# Patient Record
Sex: FEMALE | Race: WHITE | NOT HISPANIC OR LATINO | Employment: OTHER | URBAN - METROPOLITAN AREA
[De-identification: names, ages, dates, MRNs, and addresses within clinical notes are randomized per-mention and may not be internally consistent; named-entity substitution may affect disease eponyms.]

---

## 2019-08-13 ENCOUNTER — TELEPHONE (OUTPATIENT)
Dept: GASTROENTEROLOGY | Facility: CLINIC | Age: 59
End: 2019-08-13

## 2019-08-13 DIAGNOSIS — K82.4 CHOLESTEROLOSIS GALLBLADDER: Primary | ICD-10-CM

## 2019-08-13 NOTE — TELEPHONE ENCOUNTER
1-yr recall [address] [place] [address]  Provider:  Dr David Callejas:  [facility]

## 2019-10-08 NOTE — TELEPHONE ENCOUNTER
Pt left  mssg stating someone called this morning; assumes it is about US results?; asks for [facility] [phone]

## 2019-10-08 NOTE — TELEPHONE ENCOUNTER
Patient has not been seen since 2017 and no visits scheduled  We have just been ordering annual ultrasound to monitor polyp  Are we to continue to place recall or is she to come in for visit  Please advise

## 2019-10-09 NOTE — TELEPHONE ENCOUNTER
Pt left VM mssg stating someone left a mssg yesterday; is hoping it is about US results    [phone]

## 2019-10-10 NOTE — TELEPHONE ENCOUNTER
Pt states she thinks someone is trying to reach her about US  Rev'd US stable and noted to f/u w/ OV  Lengthy call w/ Pt  I rev'd ECW info (dates seen, f/u recommendations from Consuel[facility] [address] 43)  Pt questions need for f/u and wants CB to rev DI in more details [phone]

## 2019-10-10 NOTE — TELEPHONE ENCOUNTER
I spoke with patient and reviewed that ultrasound stable but Dr Juan Antonio Flanagan did agree with follow up since patient last seen 2017 for visit  Patient agreeable

## 2020-01-14 ENCOUNTER — OFFICE VISIT (OUTPATIENT)
Dept: GASTROENTEROLOGY | Facility: CLINIC | Age: 60
End: 2020-01-14
Payer: COMMERCIAL

## 2020-01-14 VITALS
SYSTOLIC BLOOD PRESSURE: 150 MMHG | DIASTOLIC BLOOD PRESSURE: 92 MMHG | HEART RATE: 64 BPM | WEIGHT: 251 LBS | HEIGHT: 66 IN | BODY MASS INDEX: 40.34 KG/M2

## 2020-01-14 DIAGNOSIS — Z83.71 FAMILY HISTORY OF COLONIC POLYPS: ICD-10-CM

## 2020-01-14 DIAGNOSIS — K76.0 FATTY LIVER: Primary | ICD-10-CM

## 2020-01-14 DIAGNOSIS — K82.4 GALLBLADDER POLYP: ICD-10-CM

## 2020-01-14 DIAGNOSIS — C50.911 MALIGNANT NEOPLASM OF RIGHT FEMALE BREAST, UNSPECIFIED ESTROGEN RECEPTOR STATUS, UNSPECIFIED SITE OF BREAST (HCC): ICD-10-CM

## 2020-01-14 PROBLEM — Z83.719 FAMILY HISTORY OF COLONIC POLYPS: Status: ACTIVE | Noted: 2020-01-14

## 2020-01-14 PROCEDURE — 99214 OFFICE O/P EST MOD 30 MIN: CPT | Performed by: INTERNAL MEDICINE

## 2020-01-14 RX ORDER — OMEPRAZOLE 20 MG/1
0.5 TABLET, DELAYED RELEASE ORAL DAILY
COMMUNITY

## 2020-01-14 RX ORDER — VITAMIN E 268 MG
400 CAPSULE ORAL DAILY
Qty: 30 CAPSULE | Refills: 11 | Status: SHIPPED | OUTPATIENT
Start: 2020-01-14

## 2020-01-14 RX ORDER — FLUTICASONE PROPIONATE 50 MCG
SPRAY, SUSPENSION (ML) NASAL DAILY
COMMUNITY

## 2020-01-14 NOTE — PROGRESS NOTES
[facility] - Outpatient Follow-up Note  Layo Kat 64 y o  female MRN: [id number]  Encounter: [id number]    ASSESSMENT AND PLAN:      1  Fatty liver  42-year-old female history of breast cancer status post chemo, completed in May 2018, here today for follow-up of her fatty liver  Previous workup in July 2017 negative for viral, autoimmune, metabolic diseases  Recent ultrasound in 2019 still showing fatty liver  - will obtain a baseline fibrosis score  Also discussed with the patient importance of weight loss  Recommend intermittent fasting  Also has not had follow-up with her primary in over 2 years  Recommend getting her cholesterol blood work followed up  - Liver Fibrosis, Fibrotest Actitest Panel; Future  - TSH, 3rd generation with Free T4 reflex; Future  - Lipid panel; Future  - Hemoglobin A1c (w/out EAG); Future    - vitamin E, tocopherol, 400 units capsule; Take 1 capsule (400 Units total) by mouth daily  Dispense: 30 capsule; Refill: 11    2  Gallbladder polyp  History of gallbladder polyps  The past few ultrasounds have showed stable sizes of these 2 polyps  No further workup is indicated at this point  3  Family history of colonic polyps  Sister with colon polyps  The last colonoscopy performed in August 2017 was negative  She was at the time given a 10 year recall  However, given the family history, we will change the recall to 5 years  Next due August 2022  4  Malignant neoplasm of right female breast, unspecified estrogen receptor status, unspecified site of breast [facility])  Status post chemotherapy, completed in May 2018  Currently without any evidence of disease  Followup Appointment:  6 months  ______________________________________________________________________    Chief Complaint   Patient presents with    Yearly Follow up    Ultrasound results     HPI:  42-year-old female here today for follow-up of her fatty liver    Really with no complaints  Initially gained about 80 lb while she was undergoing chemotherapy for her breast cancer  She has not been able to get this weight off  Currently without any complaints however  Denies any nausea vomiting, jaundice, abdominal pains  Did have her ultrasound for her gallbladder polyp follow-up which showed no change in size  Bowel movements are regular, no bleeding  Historical Information   Past Medical History:   Diagnosis Date    Abnormal LFTs     Breast cancer, right ([address] )     s/p lumpectomy & chemo    Fatty liver      Past Surgical History:   Procedure Laterality Date    BREAST LUMPECTOMY Right     and SLND    COLONOSCOPY  08/21/2017    ENDOMETRIAL ABLATION W/ NOVASURE      TONSILLECTOMY      WISDOM TOOTH EXTRACTION       Social History     Substance and Sexual Activity   Alcohol Use Not Currently    Comment: Denies use     Social History     Substance and Sexual Activity   Drug Use Never    Comment: Denies use     Social History     Tobacco Use   Smoking Status Never Smoker   Smokeless Tobacco Never Used     Family History   Problem Relation Age of Onset    Colon polyps Sister 21    Heart disease Father     Colorectal Cancer Neg Hx     Colon cancer Neg Hx          Current Outpatient Medications:     fluticasone (FLONASE) 50 mcg/act nasal spray    omeprazole (PRILOSEC OTC) 20 MG tablet    vitamin E, tocopherol, 400 units capsule  Allergies   Allergen Reactions    Anesthesia S-I-60 Vomiting     Other reaction(s): Unknown    Crab (Diagnostic)      Other reaction(s): Unknown     Reviewed medications and allergies and updated as indicated    PHYSICAL EXAM:    Blood pressure 150/92, pulse 64, height 5' 6" (1 676 m), weight 114 kg (251 lb)  Body mass index is 40 51 kg/m²  General Appearance: NAD, cooperative, alert  Eyes: Anicteric, PERRLA, EOMI  ENT:  Normocephalic, atraumatic, normal mucosa      Neck:  Supple, symmetrical, trachea midline  Resp:  Clear to auscultation bilaterally; no rales, rhonchi or wheezing; respirations unlabored   CV:  S1 S2, Regular rate and rhythm; no murmur, rub, or gallop  GI:  Soft, non-tender, non-distended; normal bowel sounds; no masses, no organomegaly   Rectal: Deferred  Musculoskeletal: No cyanosis, clubbing or edema  Normal ROM  Skin:  No jaundice, rashes, or lesions   Heme/Lymph: No palpable cervical lymphadenopathy  Psych: Normal affect, good eye contact  Neuro: No gross deficits, AAOx3    Lab Results:   No results found for: WBC, HGB, HCT, MCV, PLT  No results found for: NA, K, CL, CO2, ANIONGAP, BUN, CREATININE, GLUCOSE, GLUF, CALCIUM, CORRECTEDCA, AST, ALT, ALKPHOS, PROT, BILITOT, EGFR  No results found for: IRON, TIBC, FERRITIN  No results found for: LIPASE    Radiology Results:   No results found

## 2020-01-14 NOTE — LETTER
January 14, 2020     Nba Hamilton DO  P O  [facility]    Patient: Josefina Smith   YOB: 1960   Date of Visit: 1/14/2020       Dear Dr Kayla Stokes: Thank you for referring Josefina Smith to me for evaluation  Below are my notes for this consultation  If you have questions, please do not hesitate to call me  I look forward to following your patient along with you  Sincerely,        Rama Paredes MD        CC: No Recipients  Rama Paredes MD  1/14/2020 11:28 AM  Sign at close encounter  [facility] - Outpatient Follow-up Note  Josefina Smith 64 y o  female MRN: [id number]  Encounter: [id number]    ASSESSMENT AND PLAN:      1  Fatty liver  16-year-old female history of breast cancer status post chemo, completed in May 2018, here today for follow-up of her fatty liver  Previous workup in July 2017 negative for viral, autoimmune, metabolic diseases  Recent ultrasound in 2019 still showing fatty liver  - will obtain a baseline fibrosis score  Also discussed with the patient importance of weight loss  Recommend intermittent fasting  Also has not had follow-up with her primary in over 2 years  Recommend getting her cholesterol blood work followed up  - Liver Fibrosis, Fibrotest Actitest Panel; Future  - TSH, 3rd generation with Free T4 reflex; Future  - Lipid panel; Future  - Hemoglobin A1c (w/out EAG); Future    - vitamin E, tocopherol, 400 units capsule; Take 1 capsule (400 Units total) by mouth daily  Dispense: 30 capsule; Refill: 11    2  Gallbladder polyp  History of gallbladder polyps  The past few ultrasounds have showed stable sizes of these 2 polyps  No further workup is indicated at this point  3  Family history of colonic polyps  Sister with colon polyps  The last colonoscopy performed in August 2017 was negative  She was at the time given a 10 year recall    However, given the family history, we will change the recall to 5 years  Next due August 2022  4  Malignant neoplasm of right female breast, unspecified estrogen receptor status, unspecified site of breast [facility])  Status post chemotherapy, completed in May 2018  Currently without any evidence of disease  Followup Appointment:  6 months  ______________________________________________________________________    Chief Complaint   Patient presents with    Yearly Follow up    Ultrasound results     HPI:  56-year-old female here today for follow-up of her fatty liver  Really with no complaints  Initially gained about 80 lb while she was undergoing chemotherapy for her breast cancer  She has not been able to get this weight off  Currently without any complaints however  Denies any nausea vomiting, jaundice, abdominal pains  Did have her ultrasound for her gallbladder polyp follow-up which showed no change in size  Bowel movements are regular, no bleeding      Historical Information   Past Medical History:   Diagnosis Date    Abnormal LFTs     Breast cancer, right ([address] )     s/p lumpectomy & chemo    Fatty liver      Past Surgical History:   Procedure Laterality Date    BREAST LUMPECTOMY Right     and SLND    COLONOSCOPY  08/21/2017    ENDOMETRIAL ABLATION W/ NOVASURE      TONSILLECTOMY      WISDOM TOOTH EXTRACTION       Social History     Substance and Sexual Activity   Alcohol Use Not Currently    Comment: Denies use     Social History     Substance and Sexual Activity   Drug Use Never    Comment: Denies use     Social History     Tobacco Use   Smoking Status Never Smoker   Smokeless Tobacco Never Used     Family History   Problem Relation Age of Onset    Colon polyps Sister 21    Heart disease Father     Colorectal Cancer Neg Hx     Colon cancer Neg Hx          Current Outpatient Medications:     fluticasone (FLONASE) 50 mcg/act nasal spray    omeprazole (PRILOSEC OTC) 20 MG tablet    vitamin E, tocopherol, 400 units capsule  Allergies Allergen Reactions    Anesthesia S-I-60 Vomiting     Other reaction(s): Unknown    Crab (Diagnostic)      Other reaction(s): Unknown     Reviewed medications and allergies and updated as indicated    PHYSICAL EXAM:    Blood pressure 150/92, pulse 64, height 5' 6" (1 676 m), weight 114 kg (251 lb)  Body mass index is 40 51 kg/m²  General Appearance: NAD, cooperative, alert  Eyes: Anicteric, PERRLA, EOMI  ENT:  Normocephalic, atraumatic, normal mucosa  Neck:  Supple, symmetrical, trachea midline  Resp:  Clear to auscultation bilaterally; no rales, rhonchi or wheezing; respirations unlabored   CV:  S1 S2, Regular rate and rhythm; no murmur, rub, or gallop  GI:  Soft, non-tender, non-distended; normal bowel sounds; no masses, no organomegaly   Rectal: Deferred  Musculoskeletal: No cyanosis, clubbing or edema  Normal ROM  Skin:  No jaundice, rashes, or lesions   Heme/Lymph: No palpable cervical lymphadenopathy  Psych: Normal affect, good eye contact  Neuro: No gross deficits, AAOx3    Lab Results:   No results found for: WBC, HGB, HCT, MCV, PLT  No results found for: NA, K, CL, CO2, ANIONGAP, BUN, CREATININE, GLUCOSE, GLUF, CALCIUM, CORRECTEDCA, AST, ALT, ALKPHOS, PROT, BILITOT, EGFR  No results found for: IRON, TIBC, FERRITIN  No results found for: LIPASE    Radiology Results:   No results found

## 2020-01-14 NOTE — PATIENT INSTRUCTIONS
Non-Alcoholic Fatty Liver Disease   WHAT YOU NEED TO KNOW:   Non-alcoholic fatty liver disease (NAFLD) is a buildup of fat in your liver from a condition other than alcoholism  DISCHARGE INSTRUCTIONS:   Medicines:   · Medicines  may be given to manage blood sugar or cholesterol levels  · Take your medicine as directed  Contact your healthcare provider if you think your medicine is not helping or if you have side effects  Tell him or her if you are allergic to any medicine  Keep a list of the medicines, vitamins, and herbs you take  Include the amounts, and when and why you take them  Bring the list or the pill bottles to follow-up visits  Carry your medicine list with you in case of an emergency  Follow up with your healthcare provider as directed: You may need to return for more tests  You may also be referred to a specialist  Write down your questions so you remember to ask them during your visits  Manage NAFLD:   · Maintain a healthy weight  Ask your healthcare provider how much you should weigh  Ask him to help you create a weight loss plan if you are overweight  · Exercise  Aerobic exercise 3 times a week for 20 to 45 minutes can help decrease fat buildup in your liver  Examples are cycling, brisk walking, and jogging  Ask your healthcare provider about the best exercise plan for you  · Eat healthy foods  Examples are vegetables, fruit, whole-grain breads, low-fat dairy products, beans, lean meats, and fish  Foods low in simple carbohydrates, high fructose corn syrup, and trans fat may help decrease fat buildup in your liver  · Do not drink alcohol  Alcohol may make NAFLD worse and harm your liver  Contact your healthcare provider if:   · You have increased pain or swelling in your abdomen  · You feel more tired than usual     · You bruise or bleed easily  · Your skin or the whites of your eyes look yellow  · You have questions or concerns about your condition or care    Return to the emergency department if:   · You have shortness of breath  · You have trouble thinking clearly or are confused  · You feel lightheaded or faint  · You have shaking, chills, and a fever  © 2017 [address] [employer] Information is for End User's use only and may not be sold, redistributed or otherwise used for commercial purposes  All illustrations and images included in CareNotes® are the copyrighted property of A D A M [employer] Inc  or Daniel Ledesma  The above information is an  only  It is not intended as medical advice for individual conditions or treatments  Talk to your doctor, nurse or pharmacist before following any medical regimen to see if it is safe and effective for you

## 2020-01-17 LAB
A2 MACROGLOB SERPL-MCNC: 133 MG/DL (ref 106–279)
ALBUMIN SERPL-MCNC: 4.3 G/DL (ref 3.6–5.1)
ALBUMIN/GLOB SERPL: 2 (CALC) (ref 1–2.5)
ALP SERPL-CCNC: 93 U/L (ref 33–130)
ALT SERPL-CCNC: 55 U/L (ref 6–29)
ALT SERPL-CCNC: 56 U/L (ref 6–29)
APO A-I SERPL-MCNC: 135 MG/DL (ref 101–198)
AST SERPL-CCNC: 39 U/L (ref 10–35)
BASOPHILS # BLD AUTO: 72 CELLS/UL (ref 0–200)
BASOPHILS NFR BLD AUTO: 0.9 %
BILIRUB SERPL-MCNC: 0.6 MG/DL (ref 0.2–1.2)
BILIRUB SERPL-MCNC: 0.7 MG/DL (ref 0.2–1.2)
BUN SERPL-MCNC: 15 MG/DL (ref 7–25)
BUN/CREAT SERPL: ABNORMAL (CALC) (ref 6–22)
CALCIUM SERPL-MCNC: 9.6 MG/DL (ref 8.6–10.4)
CHLORIDE SERPL-SCNC: 105 MMOL/L (ref 98–110)
CHOLEST SERPL-MCNC: 135 MG/DL
CHOLEST/HDLC SERPL: 2.5 (CALC)
CO2 SERPL-SCNC: 28 MMOL/L (ref 20–32)
CREAT SERPL-MCNC: 0.83 MG/DL (ref 0.5–1.05)
EOSINOPHIL # BLD AUTO: 192 CELLS/UL (ref 15–500)
EOSINOPHIL NFR BLD AUTO: 2.4 %
ERYTHROCYTE [DISTWIDTH] IN BLOOD BY AUTOMATED COUNT: 12.3 % (ref 11–15)
FIBROSIS STAGE SERPL QL: ABNORMAL
GGT SERPL-CCNC: 40 U/L (ref 3–70)
GLOBULIN SER CALC-MCNC: 2.2 G/DL (CALC) (ref 1.9–3.7)
GLUCOSE SERPL-MCNC: 79 MG/DL (ref 65–99)
HAPTOGLOB SERPL-MCNC: 47 MG/DL (ref 43–212)
HBA1C MFR BLD: 4.9 % OF TOTAL HGB
HCT VFR BLD AUTO: 41.3 % (ref 35–45)
HDLC SERPL-MCNC: 54 MG/DL
HGB BLD-MCNC: 13.9 G/DL (ref 11.7–15.5)
LDLC SERPL CALC-MCNC: 67 MG/DL (CALC)
LIVER FIBR SCORE SERPL CALC.FIBROSURE: 0.27
LYMPHOCYTES # BLD AUTO: 2688 CELLS/UL (ref 850–3900)
LYMPHOCYTES NFR BLD AUTO: 33.6 %
MCH RBC QN AUTO: 31.4 PG (ref 27–33)
MCHC RBC AUTO-ENTMCNC: 33.7 G/DL (ref 32–36)
MCV RBC AUTO: 93.4 FL (ref 80–100)
MICRODELETION SYND BLD/T FISH: ABNORMAL
MICRODELETION SYND BLD/T FISH: ABNORMAL
MONOCYTES # BLD AUTO: 664 CELLS/UL (ref 200–950)
MONOCYTES NFR BLD AUTO: 8.3 %
NECROINFLAMMATORY ACT GRADE SERPL QL: ABNORMAL
NECROINFLAMMATORY ACT SCORE SERPL: 0.32
NEUTROPHILS # BLD AUTO: 4384 CELLS/UL (ref 1500–7800)
NEUTROPHILS NFR BLD AUTO: 54.8 %
NONHDLC SERPL-MCNC: 81 MG/DL (CALC)
PLATELET # BLD AUTO: 248 THOUSAND/UL (ref 140–400)
PMV BLD REES-ECKER: 10.3 FL (ref 7.5–12.5)
POTASSIUM SERPL-SCNC: 4.6 MMOL/L (ref 3.5–5.3)
PROT SERPL-MCNC: 6.5 G/DL (ref 6.1–8.1)
RBC # BLD AUTO: 4.42 MILLION/UL (ref 3.8–5.1)
SL AMB EGFR AFRICAN AMERICAN: 91 ML/MIN/1.73M2
SL AMB EGFR NON AFRICAN AMERICAN: 78 ML/MIN/1.73M2
SL AMB FOOTNOTE: ABNORMAL
SL AMB REFERENCE ID: ABNORMAL
SODIUM SERPL-SCNC: 141 MMOL/L (ref 135–146)
TRIGL SERPL-MCNC: 67 MG/DL
TSH SERPL-ACNC: 1.26 MIU/L (ref 0.4–4.5)
WBC # BLD AUTO: 8 THOUSAND/UL (ref 3.8–10.8)

## 2020-01-22 NOTE — RESULT ENCOUNTER NOTE
Still with slightly elevated liver tests  Otherwise labs are okay  Follow-up in the office as scheduled

## 2021-03-30 DIAGNOSIS — Z23 ENCOUNTER FOR IMMUNIZATION: ICD-10-CM

## 2022-01-15 PROBLEM — Z98.890 HISTORY OF ENDOMETRIAL ABLATION: Status: ACTIVE | Noted: 2022-01-15

## 2022-01-18 ENCOUNTER — ANNUAL EXAM (OUTPATIENT)
Dept: OBGYN CLINIC | Facility: CLINIC | Age: 62
End: 2022-01-18
Payer: COMMERCIAL

## 2022-01-18 VITALS
WEIGHT: 255.4 LBS | SYSTOLIC BLOOD PRESSURE: 118 MMHG | BODY MASS INDEX: 41.05 KG/M2 | DIASTOLIC BLOOD PRESSURE: 70 MMHG | HEIGHT: 66 IN

## 2022-01-18 DIAGNOSIS — Z01.419 ENCOUNTER FOR GYNECOLOGICAL EXAMINATION (GENERAL) (ROUTINE) WITHOUT ABNORMAL FINDINGS: Primary | ICD-10-CM

## 2022-01-18 DIAGNOSIS — Z12.11 COLON CANCER SCREENING: ICD-10-CM

## 2022-01-18 DIAGNOSIS — Z78.0 ASYMPTOMATIC MENOPAUSAL STATE: ICD-10-CM

## 2022-01-18 DIAGNOSIS — Z13.820 OSTEOPOROSIS SCREENING: ICD-10-CM

## 2022-01-18 DIAGNOSIS — Z12.4 SCREENING FOR MALIGNANT NEOPLASM OF THE CERVIX: ICD-10-CM

## 2022-01-18 PROCEDURE — 99396 PREV VISIT EST AGE 40-64: CPT | Performed by: OBSTETRICS & GYNECOLOGY

## 2022-01-18 RX ORDER — FLUTICASONE PROPIONATE 50 MCG
SPRAY, SUSPENSION (ML) NASAL DAILY
COMMUNITY

## 2022-01-18 RX ORDER — TRIAMCINOLONE ACETONIDE 1 MG/G
CREAM TOPICAL
COMMUNITY
Start: 2021-12-21

## 2022-01-18 NOTE — PROGRESS NOTES
Assessment/Plan:    Encounter for gynecological examination (general) (routine) without abnormal findings  All well, no complaints here for annual gyn exam  History of right breast cancer 2017, continues to follow with surgery, oncology  Normal mammo 3/2021 per pt report  Normal breast and limited pelvic exams  Pap done  Dexa order given  Due for colonoscopy, referral given  Diagnoses and all orders for this visit:    Encounter for gynecological examination (general) (routine) without abnormal findings    Screening for malignant neoplasm of the cervix  -     Thinprep Tis Pap and HPV mRNA E6/E7 Reflex HPV 16,18/45    Colon cancer screening  -     Ambulatory Referral to Gastroenterology; Future    Asymptomatic menopausal state  -     DXA bone density spine hip and pelvis; Future    Osteoporosis screening  -     DXA bone density spine hip and pelvis; Future    Other orders  -     triamcinolone (KENALOG) 0 1 % cream  -     fluticasone (FLONASE) 50 mcg/act nasal spray; in the morning          Subjective:      Patient ID: Rickey Castillo is a 63 y o  female  Here for well check  The following portions of the patient's history were reviewed and updated as appropriate:   She  has a past medical history of Abnormal LFTs, Breast cancer, right ([address] ) (2017), Fatty liver, Fracture, ankle (06/2021), GERD (gastroesophageal reflux disease), History of abnormal uterine bleeding, History of endometriosis, History of screening mammography (03/2021), Ovarian cyst, Papanicolaou smear (06/05/2018), Proximal humerus fracture (11/2020), and Uterine fibroid    She   Patient Active Problem List    Diagnosis Date Noted    Encounter for gynecological examination (general) (routine) without abnormal findings 01/18/2022    History of endometrial ablation - 2008 01/15/2022    BMI 40 0-44 9, adult ([address] ) 01/15/2022    Family history of colonic polyps 01/14/2020    Fatty liver 01/14/2020    Gallbladder polyp 01/14/2020  Breast [facility] right [address] ) 01/14/2020     She  has a past surgical history that includes Breast lumpectomy (Right, 2017); Endometrial ablation w/ novasure (2008); [place] tooth extraction; Colonoscopy (08/21/2017); Mastectomy (2017); Dilation and curettage of uterus (2008); Tonsillectomy (1984); LAPAROSCOPY ([address] [place] [address] [address]); Varicose vein surgery (2018); DXA procedure(historical) (2017); and Ankle fracture surgery (06/2021)  Her family history includes Colon polyps (age of onset: 21) in her sister; Heart disease in her father  She  reports that she has never smoked  She has never used smokeless tobacco  She reports previous alcohol use  She reports that she does not use drugs  Current Outpatient Medications   Medication Sig Dispense Refill    fluticasone (FLONASE) 50 mcg/act nasal spray daily      fluticasone (FLONASE) 50 mcg/act nasal spray in the morning      omeprazole (PRILOSEC OTC) 20 MG tablet Take 0 5 tablets by mouth daily      triamcinolone (KENALOG) 0 1 % cream       vitamin E, tocopherol, 400 units capsule Take 1 capsule (400 Units total) by mouth daily 30 capsule 11     No current facility-administered medications for this visit  She is allergic to crab (diagnostic) - food allergy and propofol       Review of Systems  No PMB, breast, bladder, bowel changes  No new persistent pain, bloating, early satiety or pelvic pressure    Objective:      /70   Ht 5' 5 5" (1 664 m)   Wt 116 kg (255 lb 6 4 oz)   Breastfeeding No   BMI 41 85 kg/m²          Physical Exam  General appearance: no distress, pleasant  Neck: thyroid without nodules or thyromegaly, no palpable adenopathy  Lymph nodes: no palpable adenopathy  Breasts: s/p right lumpectomy and XRT with changes   No masses, nodes or skin changes bilaterally  Abdomen: soft, obese, non tender, no palpable masses  Pelvic exam: normal atrophic external genitalia, urethral meatus normal, vagina atrophic without lesions, very high and poorly visualized due to habitus, no lesions palpated or visualized, bimanual limited due to habitus,  uterus small, non tender, no adnexal masses, non tender  Rectal exam: normal sphincter tone, limited due to habitus, no masses, RV confirms above

## 2022-01-18 NOTE — ASSESSMENT & PLAN NOTE
All well, no complaints here for annual gyn exam  History of right breast cancer 2017, continues to follow with surgery, oncology  Normal mammo 3/2021 per pt report  Normal breast and limited pelvic exams  Pap done  Dexa order given  Due for colonoscopy, referral given

## 2022-01-18 NOTE — PATIENT INSTRUCTIONS
Return to office in one year unless having any problems such as breast changes, bleeding, new persistent pain, new progressive bloating, new problems eating (getting full to quickly) or new constant urinary pressure that does not resolve in one week      Lubricants to consider - Replens, Astroglide or coconut oil

## 2022-01-18 NOTE — LETTER
January 18, 2022     Nonah Fitting, DO  P O  [facility]    Patient: Carlton Negron   YOB: 1960   Date of Visit: 1/18/2022       Dear Dr Clara Price: Thank you for referring Nubia Medina to me for evaluation  Below are my notes for this consultation  If you have questions, please do not hesitate to call me  I look forward to following your patient along with you  Sincerely,        Kwadwo Zeng MD        CC: No Recipients  Kwadwo Zeng MD  1/18/2022 10:20 AM  Incomplete  Assessment/Plan:    Encounter for gynecological examination (general) (routine) without abnormal findings  All well, no complaints here for annual gyn exam  History of right breast cancer 2017, continues to follow with surgery, oncology  Normal mammo 3/2021 per pt report  Normal breast and limited pelvic exams  Pap done  Dexa order given  Due for colonoscopy, referral given  Diagnoses and all orders for this visit:    Encounter for gynecological examination (general) (routine) without abnormal findings    Screening for malignant neoplasm of the cervix  -     Thinprep Tis Pap and HPV mRNA E6/E7 Reflex HPV 16,18/45    Colon cancer screening  -     Ambulatory Referral to Gastroenterology; Future    Asymptomatic menopausal state  -     DXA bone density spine hip and pelvis; Future    Osteoporosis screening  -     DXA bone density spine hip and pelvis; Future    Other orders  -     triamcinolone (KENALOG) 0 1 % cream  -     fluticasone (FLONASE) 50 mcg/act nasal spray; in the morning          Subjective:      Patient ID: Carlton Negron is a 63 y o  female  Here for well check        The following portions of the patient's history were reviewed and updated as appropriate:   She  has a past medical history of Abnormal LFTs, Breast cancer, right ([address] ) (2017), Fatty liver, Fracture, ankle (06/2021), GERD (gastroesophageal reflux disease), History of endometriosis, History of screening mammography (03/2021), Ovarian cyst, Papanicolaou smear (06/05/2018), Proximal humerus fracture (11/2020), and Uterine fibroid  She   Patient Active Problem List    Diagnosis Date Noted    Encounter for gynecological examination (general) (routine) without abnormal findings 01/18/2022    History of endometrial ablation - 2008 01/15/2022    BMI 40 0-44 9, adult ([facility] ) 01/15/2022    Family history of colonic polyps 01/14/2020    Fatty liver 01/14/2020    Gallbladder polyp 01/14/2020    Breast cancer, right ([facility] ) 01/14/2020     She  has a past surgical history that includes Breast lumpectomy (Right, 2017); Endometrial ablation w/ novasure (2008); [place] tooth extraction; Colonoscopy (08/21/2017); Mastectomy (2017); Dilation and curettage of uterus (2008); Tonsillectomy (1984); LAPAROSCOPY ([address] [place] [address] [address]); Varicose vein surgery (2018); DXA procedure(historical) (2017); and Ankle fracture surgery (06/2021)  Her family history includes Colon polyps (age of onset: 21) in her sister; Heart disease in her father  She  reports that she has never smoked  She has never used smokeless tobacco  She reports previous alcohol use  She reports that she does not use drugs  Current Outpatient Medications   Medication Sig Dispense Refill    fluticasone (FLONASE) 50 mcg/act nasal spray daily      fluticasone (FLONASE) 50 mcg/act nasal spray in the morning      omeprazole (PRILOSEC OTC) 20 MG tablet Take 0 5 tablets by mouth daily      triamcinolone (KENALOG) 0 1 % cream       vitamin E, tocopherol, 400 units capsule Take 1 capsule (400 Units total) by mouth daily 30 capsule 11     No current facility-administered medications for this visit  She is allergic to crab (diagnostic) - food allergy and propofol       Review of Systems  No PMB, breast, bladder, bowel changes   No new persistent pain, bloating, early satiety or pelvic pressure    Objective:      /70   Ht 5' 5 5" (1 664 m)   Wt 116 kg (255 lb 6 4 oz) Breastfeeding No   BMI 41 85 kg/m²          Physical Exam  General appearance: no distress, pleasant  Neck: thyroid without nodules or thyromegaly, no palpable adenopathy  Lymph nodes: no palpable adenopathy  Breasts: s/p right lumpectomy and XRT with changes  No masses, nodes or skin changes bilaterally  Abdomen: soft, obese, non tender, no palpable masses  Pelvic exam: normal atrophic external genitalia, urethral meatus normal, vagina atrophic without lesions, very high and poorly visualized due to habitus, no lesions palpated or visualized, bimanual limited due to habitus,  uterus small, non tender, no adnexal masses, non tender  Rectal exam: normal sphincter tone, limited due to habitus, no masses, RV confirms above      Danyel Thompson MD  1/18/2022  8:18 AM  Sign when Signing Visit  Assessment/Plan:    No problem-specific Assessment & Plan notes found for this encounter  There are no diagnoses linked to this encounter  Subjective:      Patient ID: Beltran Neil is a 63 y o  female  Here for well check  The following portions of the patient's history were reviewed and updated as appropriate:   She  has a past medical history of Abnormal LFTs, Abnormal vaginal bleeding, Breast cancer, right ([facility] ) (2017), Endometriosis, Fatty liver, GERD (gastroesophageal reflux disease), History of endometriosis, Ovarian cyst, Papanicolaou smear (06/05/2018), PMS (premenstrual syndrome), and Uterine fibroid  She   Patient Active Problem List    Diagnosis Date Noted    History of endometrial ablation - 2008 01/15/2022    BMI 40 0-44 9, adult ([facility] ) 01/15/2022    Family history of colonic polyps 01/14/2020    Fatty liver 01/14/2020    Gallbladder polyp 01/14/2020    Breast cancer, right ([facility] ) 01/14/2020     She  has a past surgical history that includes Breast lumpectomy (Right, 2017); Endometrial ablation w/ novasure (2008); [place] tooth extraction; Colonoscopy (08/21/2017);  Mastectomy (2017); Dilation and curettage of uterus (2008); Tonsillectomy (1984); LAPAROSCOPY ([address] [place] [address] [address]); Varicose vein surgery (2018); and DXA procedure(historical) (2017)  Her family history includes Colon polyps (age of onset: 21) in her sister; Heart disease in her father  She  reports that she has never smoked  She has never used smokeless tobacco  She reports previous alcohol use  She reports that she does not use drugs  Current Outpatient Medications   Medication Sig Dispense Refill    fluticasone (FLONASE) 50 mcg/act nasal spray daily      omeprazole (PRILOSEC OTC) 20 MG tablet Take 0 5 tablets by mouth daily      vitamin E, tocopherol, 400 units capsule Take 1 capsule (400 Units total) by mouth daily 30 capsule 11     No current facility-administered medications for this visit  She is allergic to crab (diagnostic) - food allergy and propofol       Review of Systems      Objective: There were no vitals taken for this visit           Physical Exam

## 2022-01-21 LAB
CLINICAL INFO: NORMAL
CYTO CVX: NORMAL
CYTOLOGY CMNT CVX/VAG CYTO-IMP: NORMAL
DATE PREVIOUS BX: NORMAL
HPV E6+E7 MRNA CVX QL NAA+PROBE: NOT DETECTED
LMP START DATE: NORMAL
SL AMB PREV. PAP:: NORMAL
SPECIMEN SOURCE CVX/VAG CYTO: NORMAL

## 2022-01-22 NOTE — RESULT ENCOUNTER NOTE
Called [facility] to check status of transport. Spoke with Fidel Parkinson, still unable to find transport as of now.  Trip # [id number] Stable  Follow up in the office as previously scheduled

## 2023-08-26 NOTE — PROGRESS NOTES
Assessment/Plan:    Encounter for gynecological examination (general) (routine) without abnormal findings  All well, no complaints. Frustrated with weight  Normal breast and pelvic exams. Last pap 1/18/22 neg/HPV neg; due 2027  Mammo order given, last 3/16/23  Colonoscopy 2017, cologuard with PCP 6/2023, due 2026  Dexa 3/9/22 spine osteopenia, low risk fracture. Calcium recs reviewed. BMI 43  Had gained significant weight during infertility treatments and then while managed on Megace for years with prior gyn for bleeding. Has tried weight watchers. Discussed available apps that can be helpful, notably NOOM. Breast cancer, right ([address])  Five years from diagnosis and therapy. Oncology released from care. No change in SBE/CBE. Mammo order given. Diagnoses and all orders for this visit:    Encounter for gynecological examination (general) (routine) without abnormal findings    Other orders  -     Liquid-based pap, screening  -     Restasis 0.05 % ophthalmic emulsion; 1 DROP INTO BOTH EYES TWICE DAILY          Subjective:      Patient ID: Polina Couch is a 66 y.o. female. HPI Here for well check. The following portions of the patient's history were reviewed and updated as appropriate:   She  has a past medical history of Abnormal LFTs, Breast cancer, right ([address]) (2017), Fatty liver, Fracture, ankle (06/2021), GERD (gastroesophageal reflux disease), History of abnormal uterine bleeding, History of endometriosis, History of screening mammography (03/2021), Ovarian cyst, Papanicolaou smear (06/05/2018), Proximal humerus fracture (11/2020), and Uterine fibroid.   She   Patient Active Problem List    Diagnosis Date Noted   • BMI 43 08/29/2023   • Encounter for gynecological examination (general) (routine) without abnormal findings 01/18/2022   • History of endometrial ablation - 2008 01/15/2022   • BMI 40.0-44.9, adult ([address]) 01/15/2022   • Family history of colonic polyps 01/14/2020   • Fatty liver 01/14/2020   • Gallbladder polyp 01/14/2020   • Breast cancer, right ([address]) 01/14/2020     She  has a past surgical history that includes Breast lumpectomy (Right, 2017); Endometrial ablation w/ novasure (2008); [place] tooth extraction; Colonoscopy (08/21/2017); Mastectomy (2017); Dilation and curettage of uterus (2008); Tonsillectomy (1984); LAPAROSCOPY ([place]); Varicose vein surgery (2018); DXA procedure(historical) (03/2022); Ankle fracture surgery (06/2021); and Mammo (historical) (03/16/2023). Her family history includes Heart disease in her father; No Known Problems in her maternal grandfather, mother, paternal grandfather, paternal grandmother, sister, and sister; Parkinsonism in her maternal grandmother. She  reports that she has never smoked. She has never used smokeless tobacco. She reports that she does not currently use alcohol. She reports that she does not use drugs. Current Outpatient Medications   Medication Sig Dispense Refill   • fluticasone (FLONASE) 50 mcg/act nasal spray daily     • omeprazole (PRILOSEC OTC) 20 MG tablet Take 0.5 tablets by mouth daily     • triamcinolone (KENALOG) 0.1 % cream      • vitamin E, tocopherol, 400 units capsule Take 1 capsule (400 Units total) by mouth daily 30 capsule 11   • Restasis 0.05 % ophthalmic emulsion 1 DROP INTO BOTH EYES TWICE DAILY       No current facility-administered medications for this visit. She is allergic to anesthesia s-i-40 [propofol] and crab (diagnostic) - food allergy. .    Review of Systems  No PMB, breast, bladder, bowel changes.  No new persistent pain, bloating, early satiety or pelvic pressure      Objective:      /70 (BP Location: Left arm, Patient Position: Sitting, Cuff Size: Large)   Ht 5' 4.25" (1.632 m)   Wt 115 kg (253 lb)   BMI 43.09 kg/m²          Physical Exam    General appearance: no distress, pleasant  Neck: thyroid without nodules or thyromegaly, no palpable adenopathy  Lymph nodes: no palpable adenopathy  Breasts: s/p right lumpectomy and XRT with fibrosis and skin thickening, no masses, nodes.  Left without masses, nodes or skin changes  Abdomen: soft, non tender, no palpable masses  Pelvic exam: normal atrophic external genitalia, urethral meatus normal, vagina atrophic without lesions, cervix high atrophic without lesions, bimanual limited due to habitus, uterus small, non tender, no adnexal masses, non tender  Rectal exam: normal sphincter tone, no masses, RV confirms above

## 2023-08-29 ENCOUNTER — ANNUAL EXAM (OUTPATIENT)
Dept: OBGYN CLINIC | Facility: CLINIC | Age: 63
End: 2023-08-29
Payer: COMMERCIAL

## 2023-08-29 VITALS
BODY MASS INDEX: 43.19 KG/M2 | DIASTOLIC BLOOD PRESSURE: 70 MMHG | HEIGHT: 64 IN | SYSTOLIC BLOOD PRESSURE: 118 MMHG | WEIGHT: 253 LBS

## 2023-08-29 DIAGNOSIS — Z01.419 ENCOUNTER FOR GYNECOLOGICAL EXAMINATION (GENERAL) (ROUTINE) WITHOUT ABNORMAL FINDINGS: Primary | ICD-10-CM

## 2023-08-29 DIAGNOSIS — Z12.31 ENCOUNTER FOR SCREENING MAMMOGRAM FOR MALIGNANT NEOPLASM OF BREAST: ICD-10-CM

## 2023-08-29 DIAGNOSIS — Z85.3 PERSONAL HISTORY OF BREAST CANCER: ICD-10-CM

## 2023-08-29 PROCEDURE — 99396 PREV VISIT EST AGE 40-64: CPT | Performed by: OBSTETRICS & GYNECOLOGY

## 2023-08-29 RX ORDER — CYCLOSPORINE 0.5 MG/ML
EMULSION OPHTHALMIC
COMMUNITY
Start: 2023-08-21

## 2023-08-29 NOTE — ASSESSMENT & PLAN NOTE
All well, no complaints. Frustrated with weight  Normal breast and pelvic exams. Last pap 1/18/22 neg/HPV neg; due 2027  Mammo order given, last 3/16/23  Colonoscopy 2017, cologuard with PCP 6/2023, due 2026  Dexa 3/9/22 spine osteopenia, low risk fracture. Calcium recs reviewed.

## 2023-08-29 NOTE — ASSESSMENT & PLAN NOTE
Had gained significant weight during infertility treatments and then while managed on Megace for years with prior gyn for bleeding. Has tried weight watchers. Discussed available apps that can be helpful, notably NOOM.

## 2023-08-29 NOTE — LETTER
August 29, 2023     Dr Mahad Tellez. [address] [facility]    Patient: Yen Hayden   YOB: 1960   Date of Visit: 8/29/2023       Dear Dr Amadeo Ulrich:    Thank you for referring Laurie Saunders to me for evaluation. Below are my notes for this consultation. If you have questions, please do not hesitate to call me. I look forward to following your patient along with you. Sincerely,        Eliot Pettit MD        CC: No Recipients    Eliot Pettit MD  8/29/2023  1:11 PM  Sign when Signing Visit  Assessment/Plan:    Encounter for gynecological examination (general) (routine) without abnormal findings  All well, no complaints. Frustrated with weight  Normal breast and pelvic exams. Last pap 1/18/22 neg/HPV neg; due 2027  Mammo order given, last 3/16/23  Colonoscopy 2017, cologuard with PCP 6/2023, due 2026  Dexa 3/9/22 spine osteopenia, low risk fracture. Calcium recs reviewed. BMI 43  Had gained significant weight during infertility treatments and then while managed on Megace for years with prior gyn for bleeding. Has tried weight watchers. Discussed available apps that can be helpful, notably NOOM. Breast cancer, right ([address])  Five years from diagnosis and therapy. Oncology released from care. No change in SBE/CBE. Mammo order given. Diagnoses and all orders for this visit:    Encounter for gynecological examination (general) (routine) without abnormal findings    Other orders  -     Liquid-based pap, screening  -     Restasis 0.05 % ophthalmic emulsion; 1 DROP INTO BOTH EYES TWICE DAILY         Subjective:     Patient ID: Yen Hayden is a 66 y.o. female. HPI Here for well check.     The following portions of the patient's history were reviewed and updated as appropriate:   She  has a past medical history of Abnormal LFTs, Breast cancer, right ([address]) (2017), Fatty liver, Fracture, ankle (06/2021), GERD (gastroesophageal reflux disease), History of abnormal uterine bleeding, History of endometriosis, History of screening mammography (03/2021), Ovarian cyst, Papanicolaou smear (06/05/2018), Proximal humerus fracture (11/2020), and Uterine fibroid. She   Patient Active Problem List    Diagnosis Date Noted   • BMI 43 08/29/2023   • Encounter for gynecological examination (general) (routine) without abnormal findings 01/18/2022   • History of endometrial ablation - 2008 01/15/2022   • BMI 40.0-44.9, adult ([facility]) 01/15/2022   • Family history of colonic polyps 01/14/2020   • Fatty liver 01/14/2020   • Gallbladder polyp 01/14/2020   • Breast cancer, right ([address]) 01/14/2020     She  has a past surgical history that includes Breast lumpectomy (Right, 2017); Endometrial ablation w/ novasure (2008); [place] tooth extraction; Colonoscopy (08/21/2017); Mastectomy (2017); Dilation and curettage of uterus (2008); Tonsillectomy (1984); [facility]); Varicose vein surgery (2018); DXA procedure(historical) (03/2022); Ankle fracture surgery (06/2021); and Mammo (historical) (03/16/2023). Her family history includes Heart disease in her father; No Known Problems in her maternal grandfather, mother, paternal grandfather, paternal grandmother, sister, and sister; Parkinsonism in her maternal grandmother. She  reports that she has never smoked. She has never used smokeless tobacco. She reports that she does not currently use alcohol. She reports that she does not use drugs. Current Outpatient Medications   Medication Sig Dispense Refill   • fluticasone (FLONASE) 50 mcg/act nasal spray daily     • omeprazole (PRILOSEC OTC) 20 MG tablet Take 0.5 tablets by mouth daily     • triamcinolone (KENALOG) 0.1 % cream      • vitamin E, tocopherol, 400 units capsule Take 1 capsule (400 Units total) by mouth daily 30 capsule 11   • Restasis 0.05 % ophthalmic emulsion 1 DROP INTO BOTH EYES TWICE DAILY       No current facility-administered medications for this visit.      She is allergic to anesthesia s-i-40 [propofol] and crab (diagnostic) - food allergy. .    Review of Systems No PMB, breast, bladder, bowel changes. No new persistent pain, bloating, early satiety or pelvic pressure      Objective:      /70 (BP Location: Left arm, Patient Position: Sitting, Cuff Size: Large)   Ht 5' 4.25" (1.632 m)   Wt 115 kg (253 lb)   BMI 43.09 kg/m²         Physical Exam   General appearance: no distress, pleasant  Neck: thyroid without nodules or thyromegaly, no palpable adenopathy  Lymph nodes: no palpable adenopathy  Breasts: s/p right lumpectomy and XRT with fibrosis and skin thickening, no masses, nodes.  Left without masses, nodes or skin changes  Abdomen: soft, non tender, no palpable masses  Pelvic exam: normal atrophic external genitalia, urethral meatus normal, vagina atrophic without lesions, cervix high atrophic without lesions, bimanual limited due to habitus, uterus small, non tender, no adnexal masses, non tender  Rectal exam: normal sphincter tone, no masses, RV confirms above

## 2023-08-29 NOTE — PATIENT INSTRUCTIONS
Consider exploring the  [employer] for help with nutrition/activity balance. Return to office in one year unless having any problems such as breast changes, bleeding, new persistent pain, new progressive bloating, new problems eating (getting full to quickly) or new constant urinary pressure that does not resolve in one week. Call in six months to schedule your annual visit. Continue to strive for 1200 mg of calcium and 1000 IU of vitamin D daily in diet and supplements combined for your bone health. You can only absorb 600 mg of calcium at a time. Avoid excess calcium as this may adversely effect your heart. There are 400 mg of calcium in an 8 oz serving of dairy.

## 2023-08-29 NOTE — ASSESSMENT & PLAN NOTE
Five years from diagnosis and therapy. Oncology released from care. No change in SBE/CBE. Mammo order given.

## 2024-02-21 PROBLEM — Z01.419 ENCOUNTER FOR GYNECOLOGICAL EXAMINATION (GENERAL) (ROUTINE) WITHOUT ABNORMAL FINDINGS: Status: RESOLVED | Noted: 2022-01-18 | Resolved: 2024-02-21

## 2024-08-29 NOTE — PROGRESS NOTES
Assessment/Plan:    Encounter for gynecological examination (general) (routine) without abnormal findings  Here for well check, noticed one spot in underwear 2 months ago, none since.   No other breast or gyn complaints.    Normal breast and pelvic exams.  Last pap 1/2022 neg/HPV neg;  due 2027  Mammo order given, last 3/20/24 BIRADS 2B  Colonoscopy  2017, cologuard with PCP 6/2023, due 2026  Dexa 3/2022 spine osteopenia, low risk fracture. Calcium recs reviewed.    Breast cancer, right (HCC). 2017  No change in self breast or clinical breast exams.  7 years from diagnosis, [facility].   Mammo order given.    PMB (postmenopausal bleeding)  One spot noted 2 months ago, none since.  Will check u/s and schedule f/u in 3 weeks.   Of note, h/o endometrial ablation.  Orders given.    BMI 40.0-44.9, adult ([facility])  Continued frustration.   Offered weight management consultation. Agrees, referral placed.       Diagnoses and all orders for this visit:    Encounter for gynecological examination (general) (routine) without abnormal findings    PMB (postmenopausal bleeding)  -     US pelvis complete w transvaginal; Future    BMI 40.0-44.9, adult ([facility])  -     Ambulatory Referral to Weight Management; Future    Encounter for screening mammogram for malignant neoplasm of breast  -     Mammo screening bilateral w 3d and cad; Future          Subjective:      Patient ID: Libby Evans is a 64 y.o. female.    HPI Here for well check.    The following portions of the patient's history were reviewed and updated as appropriate: She  has a past medical history of Abnormal LFTs, Breast cancer, right (HCC) (2017), Fatty liver, Fracture, ankle (06/2021), GERD (gastroesophageal reflux disease), History of abnormal uterine bleeding, History of endometriosis, History of screening mammography (03/2021), Ovarian cyst, Papanicolaou smear (06/05/2018), Proximal humerus fracture (11/2020), and Uterine fibroid.  She   Patient Active Problem List     Diagnosis Date Noted    PMB (postmenopausal bleeding) 09/06/2024    BMI 43 08/29/2023    History of endometrial ablation - 2008 01/15/2022    BMI 40.0-44.9, adult (HCC) 01/15/2022    Family history of colonic polyps 01/14/2020    Fatty liver 01/14/2020    Gallbladder polyp 01/14/2020    Breast cancer, right (HCC). 2017 01/14/2020     She  has a past surgical history that includes Breast lumpectomy (Right, 2017); Endometrial ablation w/ novasure (2008); [place] tooth extraction; Colonoscopy (08/21/2017); Mastectomy (2017); Dilation and curettage of uterus (2008); Tonsillectomy (1984); LAPAROSCOPY (1996 & 1991); Varicose vein surgery (2018); DXA procedure(historical) (03/2022); Ankle fracture surgery (06/2021); and Mammo (historical) (03/16/2023).  Her family history includes Heart disease in her father; No Known Problems in her maternal grandfather, mother, paternal grandfather, paternal grandmother, sister, and sister; Parkinsonism in her maternal grandmother.  She  reports that she has never smoked. She has never been exposed to tobacco smoke. She has never used smokeless tobacco. She reports that she does not currently use alcohol. She reports that she does not use drugs.  Current Outpatient Medications   Medication Sig Dispense Refill    fluticasone (FLONASE) 50 mcg/act nasal spray daily      omeprazole (PRILOSEC OTC) 20 MG tablet Take 0.5 tablets by mouth daily      Restasis 0.05 % ophthalmic emulsion 1 DROP INTO BOTH EYES TWICE DAILY      triamcinolone (KENALOG) 0.1 % cream       vitamin E, tocopherol, 400 units capsule Take 1 capsule (400 Units total) by mouth daily 30 capsule 11     No current facility-administered medications for this visit.     She is allergic to anesthesia s-i-40 [propofol] and crab (diagnostic) - food allergy..    Review of Systems  No breast, bladder, bowel changes. No new persistent pain, bloating, early satiety or pelvic pressure  One spot noted 2 months ago  Frustrated with weight  "    Objective:      /74 (BP Location: Left arm, Patient Position: Sitting, Cuff Size: Standard)   Ht 5' 5.5\" (1.664 m)   Wt 116 kg (256 lb)   BMI 41.95 kg/m²          Physical Exam    General appearance: no distress, pleasant  Neck: thyroid without nodules or thyromegaly, no palpable adenopathy  Lymph nodes: no palpable adenopathy  Breasts: s/p right lumpectomy and XRT with fibrosis, no masses, nodes or skin changes bilaterally  Abdomen: soft, non tender, no palpable masses  Pelvic exam: normal atrophic external genitalia, urethral meatus normal, vagina atrophic without lesions, cervix atrophic without lesions, bimanual limited due to habitus, uterus small, non tender, no adnexal masses, non tender  Rectal exam: normal sphincter tone, no masses, RV confirms above    "

## 2024-09-06 ENCOUNTER — ANNUAL EXAM (OUTPATIENT)
Dept: OBGYN CLINIC | Facility: CLINIC | Age: 64
End: 2024-09-06
Payer: COMMERCIAL

## 2024-09-06 VITALS
BODY MASS INDEX: 41.14 KG/M2 | WEIGHT: 256 LBS | HEIGHT: 66 IN | DIASTOLIC BLOOD PRESSURE: 74 MMHG | SYSTOLIC BLOOD PRESSURE: 142 MMHG

## 2024-09-06 DIAGNOSIS — Z12.31 ENCOUNTER FOR SCREENING MAMMOGRAM FOR MALIGNANT NEOPLASM OF BREAST: ICD-10-CM

## 2024-09-06 DIAGNOSIS — N95.0 PMB (POSTMENOPAUSAL BLEEDING): ICD-10-CM

## 2024-09-06 DIAGNOSIS — Z01.419 ENCOUNTER FOR GYNECOLOGICAL EXAMINATION (GENERAL) (ROUTINE) WITHOUT ABNORMAL FINDINGS: Primary | ICD-10-CM

## 2024-09-06 PROCEDURE — 99396 PREV VISIT EST AGE 40-64: CPT | Performed by: OBSTETRICS & GYNECOLOGY

## 2024-09-06 NOTE — ASSESSMENT & PLAN NOTE
No change in self breast or clinical breast exams.  7 years from diagnosis, [place].   Mammo order given.

## 2024-09-06 NOTE — ASSESSMENT & PLAN NOTE
One spot noted 2 months ago, none since.  Will check u/s and schedule f/u in 3 weeks.   Of note, h/o endometrial ablation.  Orders given.

## 2024-09-06 NOTE — PATIENT INSTRUCTIONS
Return to office in one year unless having any problems such as breast changes, bleeding, new persistent pain, new progressive bloating, new problems eating (getting full to quickly) or new constant urinary pressure that does not resolve in one week.    Continue to strive for 1200 mg of calcium and 1000 IU of vitamin D daily in diet and supplements combined for your bone health.  You can only absorb 600 mg of calcium at a time. Avoid excess calcium as this may adversely effect your heart.  There are 400 mg of calcium in an 8 oz serving of dairy.  Bard milk has 600 mg of calcium in an 8 oz serving.

## 2024-09-06 NOTE — ASSESSMENT & PLAN NOTE
Here for well check, noticed one spot in underwear 2 months ago, none since.   No other breast or gyn complaints.    Normal breast and pelvic exams.  Last pap 1/2022 neg/HPV neg;  due 2027  Mammo order given, last 3/20/24 BIRADS 2B  Colonoscopy  2017, cologuard with PCP 6/2023, due 2026  Dexa 3/2022 spine osteopenia, low risk fracture. Calcium recs reviewed.

## 2024-09-06 NOTE — LETTER
September 6, 2024     Corey Ken DO  [address]  [facility] [address]    Patient: Libby Evans   YOB: 1960   Date of Visit: 9/6/2024       Dear Dr. Ken:    Libby Evans was in today for her annual gyn exam. Below are my notes for this visit.    If you have questions, please do not hesitate to call me. I look forward to following your patient along with you.         Sincerely,        Grace Brown MD        CC: No Recipients    Grace Brown MD  9/6/2024 11:32 AM  Sign when Signing Visit  Assessment/Plan:    Encounter for gynecological examination (general) (routine) without abnormal findings  Here for well check, noticed one spot in underwear 2 months ago, none since.   No other breast or gyn complaints.    Normal breast and pelvic exams.  Last pap 1/2022 neg/HPV neg;  due 2027  Mammo order given, last 3/20/24 BIRADS 2B  Colonoscopy  2017, cologuard with PCP 6/2023, due 2026  Dexa 3/2022 spine osteopenia, low risk fracture. Calcium recs reviewed.    Breast cancer, right (HCC). 2017  No change in self breast or clinical breast exams.  7 years from diagnosis, [facility].   Mammo order given.    PMB (postmenopausal bleeding)  One spot noted 2 months ago, none since.  Will check u/s and schedule f/u in 3 weeks.   Of note, h/o endometrial ablation.  Orders given.    BMI 40.0-44.9, adult (HCC)  Continued frustration.   Offered weight management consultation. Agrees, referral placed.       Diagnoses and all orders for this visit:    Encounter for gynecological examination (general) (routine) without abnormal findings    PMB (postmenopausal bleeding)  -     US pelvis complete w transvaginal; Future    BMI 40.0-44.9, adult (HCC)  -     Ambulatory Referral to Weight Management; Future    Encounter for screening mammogram for malignant neoplasm of breast  -     Mammo screening bilateral w 3d and cad; Future          Subjective:      Patient ID: Libby Evans is a 64 y.o. female.    HPI Here for  well check.    The following portions of the patient's history were reviewed and updated as appropriate: She  has a past medical history of Abnormal LFTs, Breast cancer, right (HCC) (2017), Fatty liver, Fracture, ankle (06/2021), GERD (gastroesophageal reflux disease), History of abnormal uterine bleeding, History of endometriosis, History of screening mammography (03/2021), Ovarian cyst, Papanicolaou smear (06/05/2018), Proximal humerus fracture (11/2020), and Uterine fibroid.  She   Patient Active Problem List    Diagnosis Date Noted   • PMB (postmenopausal bleeding) 09/06/2024   • BMI 43 08/29/2023   • History of endometrial ablation - 2008 01/15/2022   • BMI 40.0-44.9, adult (HCC) 01/15/2022   • Family history of colonic polyps 01/14/2020   • Fatty liver 01/14/2020   • Gallbladder polyp 01/14/2020   • Breast cancer, right (HCC). 2017 01/14/2020     She  has a past surgical history that includes Breast lumpectomy (Right, 2017); Endometrial ablation w/ novasure (2008); [place] tooth extraction; Colonoscopy (08/21/2017); Mastectomy (2017); Dilation and curettage of uterus (2008); Tonsillectomy (1984); LAPAROSCOPY (1996 & 1991); Varicose vein surgery (2018); DXA procedure(historical) (03/2022); Ankle fracture surgery (06/2021); and Mammo (historical) (03/16/2023).  Her family history includes Heart disease in her father; No Known Problems in her maternal grandfather, mother, paternal grandfather, paternal grandmother, sister, and sister; Parkinsonism in her maternal grandmother.  She  reports that she has never smoked. She has never been exposed to tobacco smoke. She has never used smokeless tobacco. She reports that she does not currently use alcohol. She reports that she does not use drugs.  Current Outpatient Medications   Medication Sig Dispense Refill   • fluticasone (FLONASE) 50 mcg/act nasal spray daily     • omeprazole (PRILOSEC OTC) 20 MG tablet Take 0.5 tablets by mouth daily     • Restasis 0.05 %  "ophthalmic emulsion 1 DROP INTO BOTH EYES TWICE DAILY     • triamcinolone (KENALOG) 0.1 % cream      • vitamin E, tocopherol, 400 units capsule Take 1 capsule (400 Units total) by mouth daily 30 capsule 11     No current facility-administered medications for this visit.     She is allergic to anesthesia s-i-40 [propofol] and crab (diagnostic) - food allergy..    Review of Systems  No breast, bladder, bowel changes. No new persistent pain, bloating, early satiety or pelvic pressure  One spot noted 2 months ago  Frustrated with weight     Objective:      /74 (BP Location: Left arm, Patient Position: Sitting, Cuff Size: Standard)   Ht 5' 5.5\" (1.664 m)   Wt 116 kg (256 lb)   BMI 41.95 kg/m²          Physical Exam    General appearance: no distress, pleasant  Neck: thyroid without nodules or thyromegaly, no palpable adenopathy  Lymph nodes: no palpable adenopathy  Breasts: s/p right lumpectomy and XRT with fibrosis, no masses, nodes or skin changes bilaterally  Abdomen: soft, non tender, no palpable masses  Pelvic exam: normal atrophic external genitalia, urethral meatus normal, vagina atrophic without lesions, cervix atrophic without lesions, bimanual limited due to habitus, uterus small, non tender, no adnexal masses, non tender  Rectal exam: normal sphincter tone, no masses, RV confirms above    "

## 2024-10-01 NOTE — PROGRESS NOTES
Assessment/Plan:    PMB (postmenopausal bleeding)  63 yo G0 who noted isolated vaginal spot in July.   History complicated by recurrent simple endometrial hyperplasia without atypia 20 years ago with 8 years of Megace therapy. Last Megace approximately 10 years ago.   Evaluated by Dr Leyva, gyn oncology in 7/2009 for consideration of definitive surgical management. Option for medical management followed.     Pt reports endometrial ablation in 11/2008 with cautery, op note reviewed following diagnostic D&C 10/2008. This D&C was performed after continued endometrial thickening noted on u/s after Megace therapy. Pt reports continued bleeding after ablation.     U/s reviewed from 9/23/24 with 8 cm uterus and 21 mm endometrial stripe with foci of cystic change. Normal ovaries.   Reviewed risk of endometrial pathology with thickened endometrium, risk factors including h/o hyperplasia and BMI of 43.   Unable to sound or enter endometrium secondary to prior ablation.     Discussed difficulty in assessing endometrium after ablation and potential to miss neoplasia.   Recommend MRI for further assessment and probable referral to gyn oncology for opinion and surgical management.   Questions answered.       Diagnoses and all orders for this visit:    PMB (postmenopausal bleeding)  -     MRI pelvis w wo contrast; Future  -     Tissue Pathology    Endometrial thickening on ultrasound  -     MRI pelvis w wo contrast; Future    Cervical stenosis (uterine cervix)  -     MRI pelvis w wo contrast; Future    History of endometrial ablation - 2008  -     MRI pelvis w wo contrast; Future    Other orders  -     Endometrial biopsy        Subjective:      Patient ID: Libby Evans is a 64 y.o. female.    HPI Here to discuss ultrasound ordered for h/o one spot noted in July. None since.  Old records, op notes reviewed.  Ultrasound reviewed    The following portions of the patient's history were reviewed and updated as appropriate:  She  has a past medical history of Abnormal LFTs, Breast cancer, right (HCC) (2017), Fatty liver, Fracture, ankle (06/2021), GERD (gastroesophageal reflux disease), History of abnormal uterine bleeding, History of endometriosis, History of screening mammography (03/2021), Ovarian cyst, Papanicolaou smear (06/05/2018), Proximal humerus fracture (11/2020), and Uterine fibroid.  She   Patient Active Problem List    Diagnosis Date Noted    PMB (postmenopausal bleeding) 09/06/2024    BMI 43 08/29/2023    History of endometrial ablation - 2008 01/15/2022    BMI 40.0-44.9, adult (HCC) 01/15/2022    Family history of colonic polyps 01/14/2020    Fatty liver 01/14/2020    Gallbladder polyp 01/14/2020    Breast cancer, right (HCC). 2017 01/14/2020     She  has a past surgical history that includes Breast lumpectomy (Right, 2017); Endometrial ablation w/ novasure (2008); [place] tooth extraction; Colonoscopy (08/21/2017); Mastectomy (2017); Dilation and curettage of uterus (2008); Tonsillectomy (1984); LAPAROSCOPY (1996 & 1991); Varicose vein surgery (2018); DXA procedure(historical) (03/2022); Ankle fracture surgery (06/2021); and Mammo (historical) (03/16/2023).  Her family history includes Heart disease in her father; No Known Problems in her maternal grandfather, mother, paternal grandfather, paternal grandmother, sister, and sister; Parkinsonism in her maternal grandmother.  She  reports that she has never smoked. She has never been exposed to tobacco smoke. She has never used smokeless tobacco. She reports that she does not currently use alcohol. She reports that she does not use drugs.  Current Outpatient Medications   Medication Sig Dispense Refill    fluticasone (FLONASE) 50 mcg/act nasal spray daily      omeprazole (PRILOSEC OTC) 20 MG tablet Take 0.5 tablets by mouth daily      Restasis 0.05 % ophthalmic emulsion 1 DROP INTO BOTH EYES TWICE DAILY      triamcinolone (KENALOG) 0.1 % cream       vitamin E, tocopherol,  "400 units capsule Take 1 capsule (400 Units total) by mouth daily 30 capsule 11     No current facility-administered medications for this visit.     She is allergic to anesthesia s-i-40 [propofol], cephalexin, crab (diagnostic) - food allergy, and doxycycline..    Review of Systems   no further spotting after isolated July vaginal spot.    Objective:    /76 (BP Location: Left arm, Patient Position: Sitting, Cuff Size: Large)   Ht 5' 4\" (1.626 m)   Wt 116 kg (255 lb 6.4 oz)   BMI 43.84 kg/m²      Physical Exam    Appears well, no apparent distress.   Does not appear anxious or depressed.  Pelvic exam: atrophic external genitalia, vagina atrophic, no abnormal discharge, cervix without lesions, nulliparous, internal os stenosis    Endometrial biopsy    Date/Time: 10/4/2024 11:30 AM    Performed by: Grace Brown MD  Authorized by: Grace Brown MD  Universal Protocol:  Consent: Verbal consent obtained. Written consent obtained.  Risks and benefits: risks, benefits and alternatives were discussed  Consent given by: patient  Patient understanding: patient states understanding of the procedure being performed  Patient identity confirmed: verbally with patient    Indication:     Indications: Post-menopausal bleeding      Progression of post-menopausal bleeding:  Resolved  Procedure:     Procedure: endometrial biopsy with Pipelle      Procedure comment:  Suspect endocervical sampling    A bivalve speculum was placed in the vagina: yes      Cervix cleaned and prepped: yes      A paracervical block was performed: no      The cervix was dilated: yes (attempt made with os finder)      Uterus sounded: yes      Uterus sound depth (cm):  3    Specimen collected: insufficient sample size      Patient tolerated procedure well with no complications: yes    Findings:     Uterus size:  Non-gravid    Cervix: normal    Comments:     Procedure comments:  Stenosis encountered at internal os. Pt with h/o endometrial " ablation.      DATA:  9/23/24 [facility] u/s: AV uterus 8.2 cm with EMS 21 mm with foci of cystic changes. R ov 1.9 cm and L ov 1.6 cm, both WNL. No free fluid.     I have spent a total time of 45 minutes in caring for this patient on the day of the visit/encounter including Diagnostic results, Prognosis, Risks and benefits of tx options, Instructions for management, Impressions, Counseling / Coordination of care, Documenting in the medical record, Reviewing / ordering tests, medicine, procedures  , Obtaining or reviewing history  , and Communicating with other healthcare professionals .

## 2024-10-04 ENCOUNTER — OFFICE VISIT (OUTPATIENT)
Dept: OBGYN CLINIC | Facility: CLINIC | Age: 64
End: 2024-10-04
Payer: COMMERCIAL

## 2024-10-04 VITALS
HEIGHT: 64 IN | DIASTOLIC BLOOD PRESSURE: 76 MMHG | BODY MASS INDEX: 43.6 KG/M2 | SYSTOLIC BLOOD PRESSURE: 130 MMHG | WEIGHT: 255.4 LBS

## 2024-10-04 DIAGNOSIS — N95.0 PMB (POSTMENOPAUSAL BLEEDING): Primary | ICD-10-CM

## 2024-10-04 DIAGNOSIS — N88.2 CERVICAL STENOSIS (UTERINE CERVIX): ICD-10-CM

## 2024-10-04 DIAGNOSIS — R93.89 ENDOMETRIAL THICKENING ON ULTRASOUND: ICD-10-CM

## 2024-10-04 DIAGNOSIS — Z98.890 HISTORY OF ENDOMETRIAL ABLATION: ICD-10-CM

## 2024-10-04 PROCEDURE — 99214 OFFICE O/P EST MOD 30 MIN: CPT | Performed by: OBSTETRICS & GYNECOLOGY

## 2024-10-04 PROCEDURE — 58100 BIOPSY OF UTERUS LINING: CPT | Performed by: OBSTETRICS & GYNECOLOGY

## 2024-10-04 NOTE — ASSESSMENT & PLAN NOTE
65 yo G0 who noted isolated vaginal spot in July.   History complicated by recurrent simple endometrial hyperplasia without atypia 20 years ago with 8 years of Megace therapy. Last Megace approximately 10 years ago.   Evaluated by Dr Leyva, gyn oncology in 7/2009 for consideration of definitive surgical management. Option for medical management followed.     Pt reports endometrial ablation in 11/2008 with cautery, op note reviewed following diagnostic D&C 10/2008. This D&C was performed after continued endometrial thickening noted on u/s after Megace therapy. Pt reports continued bleeding after ablation.     U/s reviewed from 9/23/24 with 8 cm uterus and 21 mm endometrial stripe with foci of cystic change. Normal ovaries.   Reviewed risk of endometrial pathology with thickened endometrium, risk factors including h/o hyperplasia and BMI of 43.   Unable to sound or enter endometrium secondary to prior ablation.     Discussed difficulty in assessing endometrium after ablation and potential to miss neoplasia.   Recommend MRI for further assessment and probable referral to gyn oncology for opinion and surgical management.   Questions answered.

## 2024-10-04 NOTE — LETTER
October 4, 2024     Corey Ken DO  [address]  [facility] [address]    Patient: Libby Evans   YOB: 1960   Date of Visit: 10/4/2024       Dear Dr. Ken:    Libby Evans was in to see me today for evaluation. Below are my notes for this visit.    If you have questions, please do not hesitate to call me. I look forward to following your patient along with you.         Sincerely,        Grace Brown MD        CC: No Recipients    Grace Brown MD  10/4/2024  1:22 PM  Sign when Signing Visit  Assessment/Plan:    PMB (postmenopausal bleeding)  63 yo G0 who noted isolated vaginal spot in July.   History complicated by recurrent simple endometrial hyperplasia without atypia 20 years ago with 8 years of Megace therapy. Last Megace approximately 10 years ago.   Evaluated by Dr Leyva, gyn oncology in 7/2009 for consideration of definitive surgical management. Option for medical management followed.     Pt reports endometrial ablation in 11/2008 with cautery, op note reviewed following diagnostic D&C 10/2008. This D&C was performed after continued endometrial thickening noted on u/s after Megace therapy. Pt reports continued bleeding after ablation.     U/s reviewed from 9/23/24 with 8 cm uterus and 21 mm endometrial stripe with foci of cystic change. Normal ovaries.   Reviewed risk of endometrial pathology with thickened endometrium, risk factors including h/o hyperplasia and BMI of 43.   Unable to sound or enter endometrium secondary to prior ablation.     Discussed difficulty in assessing endometrium after ablation and potential to miss neoplasia.   Recommend MRI for further assessment and probable referral to gyn oncology for opinion and surgical management.   Questions answered.       Diagnoses and all orders for this visit:    PMB (postmenopausal bleeding)  -     MRI pelvis w wo contrast; Future  -     Tissue Pathology    Endometrial thickening on ultrasound  -     MRI pelvis w wo contrast;  Future    Cervical stenosis (uterine cervix)  -     MRI pelvis w wo contrast; Future    History of endometrial ablation - 2008  -     MRI pelvis w wo contrast; Future    Other orders  -     Endometrial biopsy        Subjective:      Patient ID: Libby Evans is a 64 y.o. female.    HPI Here to discuss ultrasound ordered for h/o one spot noted in July. None since.  Old records, op notes reviewed.  Ultrasound reviewed    The following portions of the patient's history were reviewed and updated as appropriate: She  has a past medical history of Abnormal LFTs, Breast cancer, right (HCC) (2017), Fatty liver, Fracture, ankle (06/2021), GERD (gastroesophageal reflux disease), History of abnormal uterine bleeding, History of endometriosis, History of screening mammography (03/2021), Ovarian cyst, Papanicolaou smear (06/05/2018), Proximal humerus fracture (11/2020), and Uterine fibroid.  She   Patient Active Problem List    Diagnosis Date Noted   • PMB (postmenopausal bleeding) 09/06/2024   • BMI 43 08/29/2023   • History of endometrial ablation - 2008 01/15/2022   • BMI 40.0-44.9, adult (HCC) 01/15/2022   • Family history of colonic polyps 01/14/2020   • Fatty liver 01/14/2020   • Gallbladder polyp 01/14/2020   • Breast cancer, right (HCC). 2017 01/14/2020     She  has a past surgical history that includes Breast lumpectomy (Right, 2017); Endometrial ablation w/ novasure (2008); [place] tooth extraction; Colonoscopy (08/21/2017); Mastectomy (2017); Dilation and curettage of uterus (2008); Tonsillectomy (1984); LAPAROSCOPY (1996 & 1991); Varicose vein surgery (2018); DXA procedure(historical) (03/2022); Ankle fracture surgery (06/2021); and Mammo (historical) (03/16/2023).  Her family history includes Heart disease in her father; No Known Problems in her maternal grandfather, mother, paternal grandfather, paternal grandmother, sister, and sister; Parkinsonism in her maternal grandmother.  She  reports that she has  "never smoked. She has never been exposed to tobacco smoke. She has never used smokeless tobacco. She reports that she does not currently use alcohol. She reports that she does not use drugs.  Current Outpatient Medications   Medication Sig Dispense Refill   • fluticasone (FLONASE) 50 mcg/act nasal spray daily     • omeprazole (PRILOSEC OTC) 20 MG tablet Take 0.5 tablets by mouth daily     • Restasis 0.05 % ophthalmic emulsion 1 DROP INTO BOTH EYES TWICE DAILY     • triamcinolone (KENALOG) 0.1 % cream      • vitamin E, tocopherol, 400 units capsule Take 1 capsule (400 Units total) by mouth daily 30 capsule 11     No current facility-administered medications for this visit.     She is allergic to anesthesia s-i-40 [propofol], cephalexin, crab (diagnostic) - food allergy, and doxycycline..    Review of Systems   no further spotting after isolated July vaginal spot.    Objective:    /76 (BP Location: Left arm, Patient Position: Sitting, Cuff Size: Large)   Ht 5' 4\" (1.626 m)   Wt 116 kg (255 lb 6.4 oz)   BMI 43.84 kg/m²      Physical Exam    Appears well, no apparent distress.   Does not appear anxious or depressed.  Pelvic exam: atrophic external genitalia, vagina atrophic, no abnormal discharge, cervix without lesions, nulliparous, internal os stenosis    Endometrial biopsy    Date/Time: 10/4/2024 11:30 AM    Performed by: Grace Brown MD  Authorized by: Grace Brown MD  Universal Protocol:  Consent: Verbal consent obtained. Written consent obtained.  Risks and benefits: risks, benefits and alternatives were discussed  Consent given by: patient  Patient understanding: patient states understanding of the procedure being performed  Patient identity confirmed: verbally with patient    Indication:     Indications: Post-menopausal bleeding      Progression of post-menopausal bleeding:  Resolved  Procedure:     Procedure: endometrial biopsy with Pipelle      Procedure comment:  Suspect endocervical sampling    A " bivalve speculum was placed in the vagina: yes      Cervix cleaned and prepped: yes      A paracervical block was performed: no      The cervix was dilated: yes (attempt made with os finder)      Uterus sounded: yes      Uterus sound depth (cm):  3    Specimen collected: insufficient sample size      Patient tolerated procedure well with no complications: yes    Findings:     Uterus size:  Non-gravid    Cervix: normal    Comments:     Procedure comments:  Stenosis encountered at internal os. Pt with h/o endometrial ablation.      DATA:  9/23/24 [facility] u/s: AV uterus 8.2 cm with EMS 21 mm with foci of cystic changes. R ov 1.9 cm and L ov 1.6 cm, both WNL. No free fluid.     I have spent a total time of 45 minutes in caring for this patient on the day of the visit/encounter including Diagnostic results, Prognosis, Risks and benefits of tx options, Instructions for management, Impressions, Counseling / Coordination of care, Documenting in the medical record, Reviewing / ordering tests, medicine, procedures  , Obtaining or reviewing history  , and Communicating with other healthcare professionals .

## 2024-10-07 ENCOUNTER — TELEPHONE (OUTPATIENT)
Dept: OBGYN CLINIC | Facility: CLINIC | Age: 64
End: 2024-10-07

## 2024-10-07 LAB
CLINICAL INFO: NORMAL
PATH REPORT.FINAL DX SPEC: NORMAL
SPECIMEN SOURCE: NORMAL

## 2024-10-07 NOTE — TELEPHONE ENCOUNTER
Please contact pt with test results from attempted endometrial biopsy.   No abnormal cells seen, but incomplete sampling (pt aware of this at time of biopsy).     She should schedule the MRI as discussed and ordered by calling Central Scheduling at [phone].     I will contact her after the testing to refer to appropriate specialist as indicated.   Thanks.

## 2024-10-07 NOTE — TELEPHONE ENCOUNTER
Called to patient to review results and recommendations. Has printed order for MRI. May get this done at [facility]. No further questions.

## 2024-10-11 ENCOUNTER — TELEPHONE (OUTPATIENT)
Age: 64
End: 2024-10-11

## 2024-10-11 NOTE — TELEPHONE ENCOUNTER
Patient had called regarding the MRI that Dr. Brown had ordered for her. Patient wanted to get scheduled with [facility] but they needed a pre-cert# before she would be able to schedule. Patient has [employer] insurance and will be having an MRI pelvis w wo contrast. Please advise patient can be reached at [phone].

## 2024-10-16 ENCOUNTER — TELEPHONE (OUTPATIENT)
Dept: OBGYN CLINIC | Facility: CLINIC | Age: 64
End: 2024-10-16

## 2024-10-16 NOTE — TELEPHONE ENCOUNTER
Please call pt with unread Mychart message:     Kody Lloyd. Your pathology did not show abnormal cells, but as you know we were unable to assess the entire uterine cavity.    I will contact her when the MRI is resulted next month.   Thanks.

## 2024-11-15 ENCOUNTER — RESULTS FOLLOW-UP (OUTPATIENT)
Dept: OBGYN CLINIC | Facility: CLINIC | Age: 64
End: 2024-11-15

## 2024-11-18 ENCOUNTER — TELEPHONE (OUTPATIENT)
Dept: OBGYN CLINIC | Facility: CLINIC | Age: 64
End: 2024-11-18

## 2024-11-18 DIAGNOSIS — N95.0 PMB (POSTMENOPAUSAL BLEEDING): Primary | ICD-10-CM

## 2024-11-18 DIAGNOSIS — N88.2 CERVICAL STENOSIS (UTERINE CERVIX): ICD-10-CM

## 2024-11-18 DIAGNOSIS — R93.89 ENDOMETRIAL THICKENING ON ULTRASOUND: ICD-10-CM

## 2024-11-18 DIAGNOSIS — Z85.3 PERSONAL HISTORY OF BREAST CANCER: ICD-10-CM

## 2024-11-18 DIAGNOSIS — Z98.890 HISTORY OF ENDOMETRIAL ABLATION: ICD-10-CM

## 2024-11-18 NOTE — TELEPHONE ENCOUNTER
Spoke with pt.   Recommend gyn oncology evaluation for postmenopausal bleeding, h/o endometrial ablation, inability to sample endometrium, u/s and MRI with significantly thickened endometrial stripe.   Discussed risk factors for endometrial neoplasia including PMB, thick EMS and BMI 44.   Agrees to gyn oncology evaluation, referral placed.   Questions answered.

## 2024-11-20 ENCOUNTER — DOCUMENTATION (OUTPATIENT)
Dept: HEMATOLOGY ONCOLOGY | Facility: CLINIC | Age: 64
End: 2024-11-20

## 2024-11-20 NOTE — PROGRESS NOTES
"Chart rvw'd on 2024      Referred by:  Dr. Grace Brown    Diagnosis:  PMB  History of endometrial ablation  Endometrial thickening on ultrasound  Cervical stenosis  Personal history of breast cancer    Imagin2024 US pelvis-  \"Endometrial thickening 21 mm\"    2024 MRI pelvis w/wo contrast-  \"Endometrial thickening 2.1 cm\"    Pathology:  10/4/2024-  A DIAGNOSIS SUPERFICIAL STRIPS OF BENIGN ATROPHIC GLANDULAR EPITHELIUM (SEE COMMENT)    A COMMENT     Comment: Is uncertain as to whether this epithelium is of  endometrial, lower uterine segment or  endocervical origin       Surgery:  N/A      Post surgical pathology:  N/A    Obgyn referring pt to gyn onc for discussion of definitive surgery. Pt has thickened endometrium and history of ablation so obgyn unable to fully assess to biopsy the site.      "

## 2024-12-12 ENCOUNTER — TELEPHONE (OUTPATIENT)
Dept: GYNECOLOGIC ONCOLOGY | Facility: CLINIC | Age: 64
End: 2024-12-12

## 2024-12-12 ENCOUNTER — CONSULT (OUTPATIENT)
Age: 64
End: 2024-12-12
Payer: COMMERCIAL

## 2024-12-12 VITALS
TEMPERATURE: 97.4 F | SYSTOLIC BLOOD PRESSURE: 116 MMHG | RESPIRATION RATE: 18 BRPM | OXYGEN SATURATION: 99 % | HEART RATE: 65 BPM | HEIGHT: 64 IN | BODY MASS INDEX: 43.02 KG/M2 | DIASTOLIC BLOOD PRESSURE: 68 MMHG | WEIGHT: 252 LBS

## 2024-12-12 DIAGNOSIS — Z98.890 HISTORY OF ENDOMETRIAL ABLATION: ICD-10-CM

## 2024-12-12 DIAGNOSIS — R93.89 ENDOMETRIAL THICKENING ON ULTRASOUND: ICD-10-CM

## 2024-12-12 DIAGNOSIS — N95.0 PMB (POSTMENOPAUSAL BLEEDING): Primary | ICD-10-CM

## 2024-12-12 DIAGNOSIS — N88.2 CERVICAL STENOSIS (UTERINE CERVIX): ICD-10-CM

## 2024-12-12 DIAGNOSIS — R93.89 THICKENED ENDOMETRIUM: ICD-10-CM

## 2024-12-12 DIAGNOSIS — Z85.3 PERSONAL HISTORY OF BREAST CANCER: ICD-10-CM

## 2024-12-12 DIAGNOSIS — N80.9 ENDOMETRIOSIS: ICD-10-CM

## 2024-12-12 PROCEDURE — 99205 OFFICE O/P NEW HI 60 MIN: CPT | Performed by: OBSTETRICS & GYNECOLOGY

## 2024-12-12 PROCEDURE — 99459 PELVIC EXAMINATION: CPT | Performed by: OBSTETRICS & GYNECOLOGY

## 2024-12-12 RX ORDER — HEPARIN SODIUM 5000 [USP'U]/ML
5000 INJECTION, SOLUTION INTRAVENOUS; SUBCUTANEOUS
OUTPATIENT
Start: 2024-12-12 | End: 2024-12-13

## 2024-12-12 RX ORDER — ACETAMINOPHEN 325 MG/1
975 TABLET ORAL ONCE
OUTPATIENT
Start: 2024-12-12 | End: 2024-12-12

## 2024-12-12 RX ORDER — SODIUM CHLORIDE, SODIUM LACTATE, POTASSIUM CHLORIDE, CALCIUM CHLORIDE 600; 310; 30; 20 MG/100ML; MG/100ML; MG/100ML; MG/100ML
125 INJECTION, SOLUTION INTRAVENOUS CONTINUOUS
OUTPATIENT
Start: 2024-12-12

## 2024-12-12 NOTE — ASSESSMENT & PLAN NOTE
Endometrium thickened to 2.1 cm.  See above for management.  Orders:    Case request operating room: HYSTERECTOMY LAPAROSCOPIC TOTAL (LTH) W/ ROBOTICS; Standing

## 2024-12-12 NOTE — ASSESSMENT & PLAN NOTE
64-year-old with postmenopausal bleeding, thickened endometrium, history of pelvic endometriosis status post 2 prior laparoscopies, history of simple hyperplasia without atypia status post endometrial ablation in 2008.  I reviewed pelvic ultrasound and MRI reports, ECC pathology, office notes.  Her performance status is 1.  1.  I reviewed the differential diagnosis of postmenopausal bleeding and thickened endometrium including endometrial polyp/benign disease, hyperplasia, malignancy.  2.  We reviewed treatment options including D&C hysteroscopy versus hysterectomy.  3.  Based on the risks and benefits of the different surgical treatment approaches, she has elected for hysterectomy.  4.  I discussed the risks of examination under esthesia, robotic assisted or laparoscopic hysterectomy, bilateral salpingo-oophorectomy, intraoperative frozen section analysis, possible lymph node dissection, possible exploratory laparotomy and all other indicated procedures.  She understands the risks of the operation and agrees to proceed as outlined.  Consent for surgery was obtained by me.  Orders:    Case request operating room: HYSTERECTOMY LAPAROSCOPIC TOTAL (LTH) W/ ROBOTICS; Standing    Type and screen; Future    Comprehensive metabolic panel; Future    CBC and Platelet; Future    Protime-INR; Future    HEMOGLOBIN A1C W/ EAG ESTIMATION; Future    EKG 12 lead; Future    XR chest pa and lateral; Future    Ambulatory Referral to Gynecologic Oncology

## 2024-12-12 NOTE — LETTER
2024     Grace Brown MD  [address]  [address]  [place] [address]    Patient: Libby Evans   YOB: 1960   Date of Visit: 2024       Dear Dr. Brown:    Thank you for referring Libby Evans to me for evaluation. Below are my notes for this consultation.    If you have questions, please do not hesitate to call me. I look forward to following your patient along with you.         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  I reviewed pelvic ultrasound and MRI reports, ECC pathology, office notes.  Her performance status is 1.  1.  I reviewed the differential diagnosis of postmenopausal bleeding and thickened endometrium including endometrial polyp/benign disease, hyperplasia, malignancy.  2.  We reviewed treatment options including D&C hysteroscopy versus hysterectomy.  3.  Based on the risks and benefits of the different surgical treatment approaches, she has elected for hysterectomy.  4.  I discussed the risks of examination under esthesia, robotic assisted or laparoscopic hysterectomy, bilateral salpingo-oophorectomy, intraoperative frozen section analysis, possible lymph node dissection, possible exploratory laparotomy and all other indicated procedures.  She understands the risks of the operation and agrees to proceed as outlined.  Consent for surgery  was obtained by me.  Orders:  •  Case request operating room: HYSTERECTOMY LAPAROSCOPIC TOTAL (LTH) W/ ROBOTICS; Standing  •  Type and screen; Future  •  Comprehensive metabolic panel; Future  •  CBC and Platelet; Future  •  Protime-INR; Future  •  HEMOGLOBIN A1C W/ EAG ESTIMATION; Future  •  EKG 12 lead; Future  •  XR chest pa and lateral; Future  •  Ambulatory Referral to Gynecologic Oncology    Thickened endometrium  Endometrium thickened to 2.1 cm.  See above for management.  Orders:  •  Case request operating room: HYSTERECTOMY LAPAROSCOPIC TOTAL (LTH) W/ ROBOTICS; Standing    BMI 43  Continue to monitor we discussed the additional risk of wound healing complications with morbid obesity  Orders:  •  Ambulatory Referral to Gynecologic Oncology    History of endometrial ablation - 2008  She understands that although repeat D&C to evaluate postmenopausal bleeding and thickened endometrium is reasonable, prior ablation increases the risk of endometrial scarring and may result in suboptimal evaluation of the endometrial cavity.  Additionally, hysterectomy would be recommended with hyperplasia or malignancy.  Orders:  •  Ambulatory Referral to Gynecologic Oncology    Endometrial thickening on ultrasound  Differential diagnosis includes polyp, hyperplasia, malignancy  Orders:  •  Ambulatory Referral to Gynecologic Oncology    Endometriosis  That is post laparoscopy x 2 last 1996 with laser ablation.               History of Present Illness   Reason for Visit / CC: Postmenopausal bleeding, thickened endometrium   Libby Evans is a 64 y.o. female   Who presents as a consultation from Dr. Brown to discuss treatment options for postmenopausal bleeding, thickened endometrium.  She has a history of simple hyperplasia without atypia diagnosed approximately 20 years ago.  She wanted to avoid hysterectomy at that time and was placed on Megace therapy for 8 years.  She gained weight which she has not been able to lose.   "She had an endometrial ablation in 2008.  She had a single episode of spotting in July.  She was seen and an ECC was performed on 10/4/2024 because the cervix was stenotic and endometrial biopsy could not be performed in the office.  The ECC revealed superficial strips of benign atrophic glandular epithelium.  Pelvic ultrasound 9/23/2024 revealed the uterus to measure 8.2 x 4.9 cm with endometrial thickness of 2.1 cm.  The ovaries were normal.  This was followed by an MRI of the pelvis 11/11/2024 which revealed the uterus to measure 7.7 cm.  The ovaries were not seen.  The endometrium was thickened.  There was no evidence of lymphadenopathy or ascites.  Is a history of endometriosis and is status post 2 prior laparoscopies the last was in 1996.  She stated that the physician told her that there was something \"hairy\" but that she had laser ablation of pelvic endometriosis.  She is currently asymptomatic.  She is able to perform her actives of daily living.  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    Pertinent Medical History          Review of Systems A complete review of systems is negative other than that noted above in the HPI.       Objective   /68 (BP Location: Left arm, Patient Position: Sitting, Cuff Size: Large)   Pulse 65   Temp (!) 97.4 °F (36.3 °C)   Resp 18   Ht 5' 4\" (1.626 m)   Wt 114 kg (252 lb)   SpO2 99%   BMI 43.26 kg/m²     Body mass index is 43.26 kg/m².  ECOG   0  Physical Exam  Vitals reviewed. Exam conducted with a chaperone present.   Constitutional:       General: She is not in acute distress.     Appearance: Normal appearance. She is well-developed. She is not ill-appearing, toxic-appearing or diaphoretic.   HENT:      Head: Normocephalic and atraumatic.   Eyes:      General: No scleral icterus.     Extraocular Movements: Extraocular movements intact.      Conjunctiva/sclera: Conjunctivae normal.   Neck:      Thyroid: No thyromegaly. "   Cardiovascular:      Rate and Rhythm: Normal rate and regular rhythm.      Heart sounds: Normal heart sounds.   Pulmonary:      Effort: Pulmonary effort is normal.      Breath sounds: Normal breath sounds.   Abdominal:      General: There is no distension.      Palpations: Abdomen is soft. There is no mass.      Tenderness: There is no abdominal tenderness. There is no guarding or rebound.      Hernia: No hernia is present.   Genitourinary:     Comments: The external female genitalia is normal. The bartholin's, uretheral and skenes glands are normal. The urethral meatus is normal (midline with no lesions). Anus without fissure or lesion. Speculum exam reveals vagina without lesion or discharge.  There is atrophy.  Cervix is normal appearing without visible lesions. No bleeding. No significant cystocele or rectocele noted. Bimanual exam notes a uterus with normal contour, mobility. No tenderness. Adnexa without masses or tenderness. Bladder is without fullness, mass or tenderness.    Musculoskeletal:         General: No swelling or tenderness.      Cervical back: Normal range of motion and neck supple.      Right lower leg: No edema.      Left lower leg: No edema.   Lymphadenopathy:      Cervical: No cervical adenopathy.   Skin:     General: Skin is warm and dry.      Coloration: Skin is not jaundiced or pale.      Findings: No lesion or rash.   Neurological:      General: No focal deficit present.      Mental Status: She is alert and oriented to person, place, and time. Mental status is at baseline.      Cranial Nerves: No cranial nerve deficit.      Motor: No weakness.      Gait: Gait normal.   Psychiatric:         Mood and Affect: Mood normal.         Behavior: Behavior normal.         Thought Content: Thought content normal.         Judgment: Judgment normal.            Radiology Results Review: I have reviewed radiology reports from [facility] including: MRI pelvis and Ultrasound(s).  Other Study  Results Review : Pathology reports reviewed.

## 2024-12-12 NOTE — ASSESSMENT & PLAN NOTE
Continue to monitor we discussed the additional risk of wound healing complications with morbid obesity  Orders:    Ambulatory Referral to Gynecologic Oncology

## 2024-12-12 NOTE — PROGRESS NOTES
Name: Libby Evans      : 1960      MRN: [id number]  Encounter Provider: Cesar Bradley MD  Encounter Date: 2024   Encounter department: [facility] GYNECOLOGY ONCOLOGY [facility]  :  Assessment & Plan  PMB (postmenopausal bleeding)  64-year-old with postmenopausal bleeding, thickened endometrium, history of pelvic endometriosis status post 2 prior laparoscopies, history of simple hyperplasia without atypia status post endometrial ablation in .  I reviewed pelvic ultrasound and MRI reports, ECC pathology, office notes.  Her performance status is 1.  1.  I reviewed the differential diagnosis of postmenopausal bleeding and thickened endometrium including endometrial polyp/benign disease, hyperplasia, malignancy.  2.  We reviewed treatment options including D&C hysteroscopy versus hysterectomy.  3.  Based on the risks and benefits of the different surgical treatment approaches, she has elected for hysterectomy.  4.  I discussed the risks of examination under esthesia, robotic assisted or laparoscopic hysterectomy, bilateral salpingo-oophorectomy, intraoperative frozen section analysis, possible lymph node dissection, possible exploratory laparotomy and all other indicated procedures.  She understands the risks of the operation and agrees to proceed as outlined.  Consent for surgery was obtained by me.  Orders:    Case request operating room: HYSTERECTOMY LAPAROSCOPIC TOTAL (LTH) W/ ROBOTICS; Standing    Type and screen; Future    Comprehensive metabolic panel; Future    CBC and Platelet; Future    Protime-INR; Future    HEMOGLOBIN A1C W/ EAG ESTIMATION; Future    EKG 12 lead; Future    XR chest pa and lateral; Future    Ambulatory Referral to Gynecologic Oncology    Thickened endometrium  Endometrium thickened to 2.1 cm.  See above for management.  Orders:    Case request operating room: HYSTERECTOMY LAPAROSCOPIC TOTAL (LTH) W/ ROBOTICS;  Standing    BMI 43  Continue to monitor we discussed the additional risk of wound healing complications with morbid obesity  Orders:    Ambulatory Referral to Gynecologic Oncology    History of endometrial ablation - 2008  She understands that although repeat D&C to evaluate postmenopausal bleeding and thickened endometrium is reasonable, prior ablation increases the risk of endometrial scarring and may result in suboptimal evaluation of the endometrial cavity.  Additionally, hysterectomy would be recommended with hyperplasia or malignancy.  Orders:    Ambulatory Referral to Gynecologic Oncology    Endometrial thickening on ultrasound  Differential diagnosis includes polyp, hyperplasia, malignancy  Orders:    Ambulatory Referral to Gynecologic Oncology    Endometriosis  That is post laparoscopy x 2 last 1996 with laser ablation.               History of Present Illness   Reason for Visit / CC: Postmenopausal bleeding, thickened endometrium   Libby Evans is a 64 y.o. female   Who presents as a consultation from Dr. Brown to discuss treatment options for postmenopausal bleeding, thickened endometrium.  She has a history of simple hyperplasia without atypia diagnosed approximately 20 years ago.  She wanted to avoid hysterectomy at that time and was placed on Megace therapy for 8 years.  She gained weight which she has not been able to lose.  She had an endometrial ablation in 2008.  She had a single episode of spotting in July.  She was seen and an ECC was performed on 10/4/2024 because the cervix was stenotic and endometrial biopsy could not be performed in the office.  The ECC revealed superficial strips of benign atrophic glandular epithelium.  Pelvic ultrasound 9/23/2024 revealed the uterus to measure 8.2 x 4.9 cm with endometrial thickness of 2.1 cm.  The ovaries were normal.  This was followed by an MRI of the pelvis 11/11/2024 which revealed the uterus to measure 7.7 cm.  The ovaries were not seen.  The  "endometrium was thickened.  There was no evidence of lymphadenopathy or ascites.  Is a history of endometriosis and is status post 2 prior laparoscopies the last was in 1996.  She stated that the physician told her that there was something \"hairy\" but that she had laser ablation of pelvic endometriosis.  She is currently asymptomatic.  She is able to perform her actives of daily living.  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    Pertinent Medical History          Review of Systems A complete review of systems is negative other than that noted above in the HPI.       Objective   /68 (BP Location: Left arm, Patient Position: Sitting, Cuff Size: Large)   Pulse 65   Temp (!) 97.4 °F (36.3 °C)   Resp 18   Ht 5' 4\" (1.626 m)   Wt 114 kg (252 lb)   SpO2 99%   BMI 43.26 kg/m²     Body mass index is 43.26 kg/m².  ECOG   0  Physical Exam  Vitals reviewed. Exam conducted with a chaperone present.   Constitutional:       General: She is not in acute distress.     Appearance: Normal appearance. She is well-developed. She is not ill-appearing, toxic-appearing or diaphoretic.   HENT:      Head: Normocephalic and atraumatic.   Eyes:      General: No scleral icterus.     Extraocular Movements: Extraocular movements intact.      Conjunctiva/sclera: Conjunctivae normal.   Neck:      Thyroid: No thyromegaly.   Cardiovascular:      Rate and Rhythm: Normal rate and regular rhythm.      Heart sounds: Normal heart sounds.   Pulmonary:      Effort: Pulmonary effort is normal.      Breath sounds: Normal breath sounds.   Abdominal:      General: There is no distension.      Palpations: Abdomen is soft. There is no mass.      Tenderness: There is no abdominal tenderness. There is no guarding or rebound.      Hernia: No hernia is present.   Genitourinary:     Comments: The external female genitalia is normal. The bartholin's, uretheral and skenes glands are normal. The urethral meatus is " normal (midline with no lesions). Anus without fissure or lesion. Speculum exam reveals vagina without lesion or discharge.  There is atrophy.  Cervix is normal appearing without visible lesions. No bleeding. No significant cystocele or rectocele noted. Bimanual exam notes a uterus with normal contour, mobility. No tenderness. Adnexa without masses or tenderness. Bladder is without fullness, mass or tenderness.    Musculoskeletal:         General: No swelling or tenderness.      Cervical back: Normal range of motion and neck supple.      Right lower leg: No edema.      Left lower leg: No edema.   Lymphadenopathy:      Cervical: No cervical adenopathy.   Skin:     General: Skin is warm and dry.      Coloration: Skin is not jaundiced or pale.      Findings: No lesion or rash.   Neurological:      General: No focal deficit present.      Mental Status: She is alert and oriented to person, place, and time. Mental status is at baseline.      Cranial Nerves: No cranial nerve deficit.      Motor: No weakness.      Gait: Gait normal.   Psychiatric:         Mood and Affect: Mood normal.         Behavior: Behavior normal.         Thought Content: Thought content normal.         Judgment: Judgment normal.            Radiology Results Review: I have reviewed radiology reports from [facility] including: MRI pelvis and Ultrasound(s).  Other Study Results Review : Pathology reports reviewed.

## 2024-12-12 NOTE — TELEPHONE ENCOUNTER
Phoned patient to discuss surgery scheduling, surgery scheduled for 02/07/2025 at [facility].  Pre Op and Post op instructions reviewed.  Post op appointment scheduled.   All questions/concerns addressed.  Provided patient with call back number for any questions/concerns.

## 2024-12-12 NOTE — ASSESSMENT & PLAN NOTE
She understands that although repeat D&C to evaluate postmenopausal bleeding and thickened endometrium is reasonable, prior ablation increases the risk of endometrial scarring and may result in suboptimal evaluation of the endometrial cavity.  Additionally, hysterectomy would be recommended with hyperplasia or malignancy.  Orders:    Ambulatory Referral to Gynecologic Oncology

## 2024-12-13 NOTE — TELEPHONE ENCOUNTER
Conflict with surgery date, surgery rescheduled for 2/14/2025, [facility].  Post op appointment rescheduled to reflect new surgery date.

## 2025-02-02 ENCOUNTER — APPOINTMENT (OUTPATIENT)
Dept: LAB | Facility: HOSPITAL | Age: 65
End: 2025-02-02
Payer: COMMERCIAL

## 2025-02-02 ENCOUNTER — HOSPITAL ENCOUNTER (OUTPATIENT)
Dept: RADIOLOGY | Facility: HOSPITAL | Age: 65
Discharge: HOME | End: 2025-02-02
Payer: COMMERCIAL

## 2025-02-02 ENCOUNTER — OFFICE VISIT (OUTPATIENT)
Dept: LAB | Facility: HOSPITAL | Age: 65
End: 2025-02-02
Payer: COMMERCIAL

## 2025-02-02 DIAGNOSIS — N95.0 PMB (POSTMENOPAUSAL BLEEDING): ICD-10-CM

## 2025-02-02 DIAGNOSIS — N95.0 POSTMENOPAUSAL BLEEDING: ICD-10-CM

## 2025-02-02 LAB
ABO GROUP BLD: NORMAL
ALBUMIN SERPL BCG-MCNC: 4.5 G/DL (ref 3.5–5)
ALP SERPL-CCNC: 88 U/L (ref 34–104)
ALT SERPL W P-5'-P-CCNC: 40 U/L (ref 7–52)
ANION GAP SERPL CALCULATED.3IONS-SCNC: 6 MMOL/L (ref 4–13)
AST SERPL W P-5'-P-CCNC: 37 U/L (ref 13–39)
BILIRUB SERPL-MCNC: 0.86 MG/DL (ref 0.2–1)
BLD GP AB SCN SERPL QL: NEGATIVE
BUN SERPL-MCNC: 12 MG/DL (ref 5–25)
CALCIUM SERPL-MCNC: 9.2 MG/DL (ref 8.4–10.2)
CHLORIDE SERPL-SCNC: 104 MMOL/L (ref 96–108)
CO2 SERPL-SCNC: 28 MMOL/L (ref 21–32)
CREAT SERPL-MCNC: 0.78 MG/DL (ref 0.6–1.3)
ERYTHROCYTE [DISTWIDTH] IN BLOOD BY AUTOMATED COUNT: 12.6 % (ref 11.6–15.1)
EST. AVERAGE GLUCOSE BLD GHB EST-MCNC: 97 MG/DL
GFR SERPL CREATININE-BSD FRML MDRD: 81 ML/MIN/1.73SQ M
GLUCOSE P FAST SERPL-MCNC: 91 MG/DL (ref 65–99)
HBA1C MFR BLD: 5 %
HCT VFR BLD AUTO: 42.4 % (ref 34.8–46.1)
HGB BLD-MCNC: 14.5 G/DL (ref 11.5–15.4)
INR PPP: 0.99 (ref 0.85–1.19)
MCH RBC QN AUTO: 31.9 PG (ref 26.8–34.3)
MCHC RBC AUTO-ENTMCNC: 34.2 G/DL (ref 31.4–37.4)
MCV RBC AUTO: 93 FL (ref 82–98)
PLATELET # BLD AUTO: 218 THOUSANDS/UL (ref 149–390)
PMV BLD AUTO: 9.5 FL (ref 8.9–12.7)
POTASSIUM SERPL-SCNC: 3.9 MMOL/L (ref 3.5–5.3)
PROT SERPL-MCNC: 7 G/DL (ref 6.4–8.4)
PROTHROMBIN TIME: 13.6 SECONDS (ref 12.3–15)
RBC # BLD AUTO: 4.55 MILLION/UL (ref 3.81–5.12)
RH BLD: POSITIVE
SODIUM SERPL-SCNC: 138 MMOL/L (ref 135–147)
SPECIMEN EXPIRATION DATE: NORMAL
WBC # BLD AUTO: 6.46 THOUSAND/UL (ref 4.31–10.16)

## 2025-02-02 PROCEDURE — 71046 X-RAY EXAM CHEST 2 VIEWS: CPT

## 2025-02-02 PROCEDURE — 85027 COMPLETE CBC AUTOMATED: CPT

## 2025-02-02 PROCEDURE — 86901 BLOOD TYPING SEROLOGIC RH(D): CPT

## 2025-02-02 PROCEDURE — 36415 COLL VENOUS BLD VENIPUNCTURE: CPT

## 2025-02-02 PROCEDURE — 86850 RBC ANTIBODY SCREEN: CPT

## 2025-02-02 PROCEDURE — 83036 HEMOGLOBIN GLYCOSYLATED A1C: CPT

## 2025-02-02 PROCEDURE — 93005 ELECTROCARDIOGRAM TRACING: CPT

## 2025-02-02 PROCEDURE — 86900 BLOOD TYPING SEROLOGIC ABO: CPT

## 2025-02-02 PROCEDURE — 85610 PROTHROMBIN TIME: CPT

## 2025-02-02 PROCEDURE — 80053 COMPREHEN METABOLIC PANEL: CPT

## 2025-02-04 ENCOUNTER — TELEPHONE (OUTPATIENT)
Dept: GYNECOLOGIC ONCOLOGY | Facility: CLINIC | Age: 65
End: 2025-02-04

## 2025-02-04 NOTE — TELEPHONE ENCOUNTER
Return call placed. Encouraged patient to be evaluated by PCP or urgent care to confirm abx are not needed.

## 2025-02-04 NOTE — TELEPHONE ENCOUNTER
Patient contacted me regarding a lesion under her right arm pit area.  The lesion has ruptured and had some significant oozing, which is starting to resolve.  Patient mentioned that she had been applying topical mupirocin 2% to the area.  Patient is concerned with upcoming surgery scheduled for 2/14, if lesion will cause an issue.  Please contact patient at [phone], she wishes to discuss.

## 2025-02-05 LAB
ATRIAL RATE: 62 BPM
P AXIS: 68 DEGREES
PR INTERVAL: 148 MS
QRS AXIS: 63 DEGREES
QRSD INTERVAL: 76 MS
QT INTERVAL: 410 MS
QTC INTERVAL: 417 MS
T WAVE AXIS: 50 DEGREES
VENTRICULAR RATE: 62 BPM

## 2025-02-05 PROCEDURE — 93010 ELECTROCARDIOGRAM REPORT: CPT | Performed by: INTERNAL MEDICINE

## 2025-02-11 ENCOUNTER — DOCUMENTATION (OUTPATIENT)
Dept: GYNECOLOGIC ONCOLOGY | Facility: CLINIC | Age: 65
End: 2025-02-11

## 2025-02-11 NOTE — PROGRESS NOTES
Prema called concerned about removal of the ovaries and the potential impact on hormonal function.  I reassured her that after age 62, the ovaries are producing minimal estrogens of the impact on bone health and cardiovascular health with bilateral oophorectomy is minimal.  She was also concerned about the potential risks of hysterectomy.  I therefore discussed alternative treatment options including an attempt at dilation and curettage with hysteroscopy followed by hysterectomy in the event that the D&C cannot be performed with preservation of the ovaries in the event that there is no evidence of malignancy in the endometrium and there is no evidence of any abnormality in the ovaries.  The other option would be to proceed with D&C only and reschedule the hysterectomy in the event that the D&C is not successful.  She would prefer to proceed with attempted D&C with possible hysterectomy and preservation of the ovaries.  She understands that if she has a D&C and we are able to remove the endometrial tissue, she ultimately may require hysterectomy if there is hyperplasia or malignancy present.  She also understands that the D&C may be limited due to prior ablation that may have caused scarring in the endometrial cavity and/or cervix.  Will therefore plan to adjust the consent when she presents for surgery on 2/14/2025.

## 2025-02-12 ENCOUNTER — ANESTHESIA EVENT (OUTPATIENT)
Dept: PERIOP | Facility: HOSPITAL | Age: 65
End: 2025-02-12
Payer: COMMERCIAL

## 2025-02-12 RX ORDER — OMEPRAZOLE 20 MG/1
20 CAPSULE, DELAYED RELEASE ORAL DAILY
COMMUNITY

## 2025-02-12 NOTE — PRE-PROCEDURE INSTRUCTIONS
Pre-Surgery Instructions:   Medication Instructions    omeprazole (PriLOSEC) 20 mg delayed release capsule Take day of surgery.    fluticasone (FLONASE) 50 mcg/act nasal spray Take night before surgery    Restasis 0.05 % ophthalmic emulsion Take day of surgery.    Medication instructions for day surgery reviewed. Please use only a sip of water to take your instructed medications. Avoid all over the counter vitamins, supplements and NSAIDS for one week prior to surgery per anesthesia guidelines. Tylenol is ok to take as needed.     You will receive a call one business day prior to surgery with an arrival time and hospital directions. If your surgery is scheduled on a Monday, the hospital will be calling you on the Friday prior to your surgery. If you have not heard from anyone by 8pm, please call the hospital supervisor through the hospital  at [phone]. ([employer] [phone] or [place] [phone]).    Do not eat or drink anything after midnight the night before your surgery, including candy, mints, lifesavers, or chewing gum. Do not drink alcohol 24hrs before your surgery. Try not to smoke at least 24hrs before your surgery.       Follow the pre surgery showering instructions as listed in the “My Surgical Experience Booklet” or otherwise provided by your surgeon's office. Do not use a blade to shave the surgical area 1 week before surgery. It is okay to use a clean electric clippers up to 24 hours before surgery. Do not apply any lotions, creams, including makeup, cologne, deodorant, or perfumes after showering on the day of your surgery. Do not use dry shampoo, hair spray, hair gel, or any type of hair products.     No contact lenses, eye make-up, or artificial eyelashes. Remove nail polish, including gel polish, and any artificial, gel, or acrylic nails if possible. Remove all jewelry including rings and body piercing jewelry.     Wear causal clothing that is easy to take on and off. Consider your  type of surgery.    Keep any valuables, jewelry, piercings at home. Please bring any specially ordered equipment (sling, braces) if indicated.    Arrange for a responsible person to drive you to and from the hospital on the day of your surgery. Please confirm the visitor policy for the day of your procedure when you receive your phone call with an arrival time.     Call the surgeon's office with any new illnesses, exposures, or additional questions prior to surgery.    Please reference your “My Surgical Experience Booklet” for additional information to prepare for your upcoming surgery.

## 2025-02-14 ENCOUNTER — HOSPITAL ENCOUNTER (OUTPATIENT)
Facility: HOSPITAL | Age: 65
Setting detail: OUTPATIENT SURGERY
Discharge: HOME | End: 2025-02-14
Attending: OBSTETRICS & GYNECOLOGY | Admitting: OBSTETRICS & GYNECOLOGY
Payer: COMMERCIAL

## 2025-02-14 ENCOUNTER — ANESTHESIA (OUTPATIENT)
Dept: PERIOP | Facility: HOSPITAL | Age: 65
End: 2025-02-14
Payer: COMMERCIAL

## 2025-02-14 VITALS
SYSTOLIC BLOOD PRESSURE: 146 MMHG | RESPIRATION RATE: 12 BRPM | OXYGEN SATURATION: 96 % | DIASTOLIC BLOOD PRESSURE: 69 MMHG | HEART RATE: 74 BPM | BODY MASS INDEX: 42.2 KG/M2 | TEMPERATURE: 97.2 F | WEIGHT: 253.31 LBS | HEIGHT: 65 IN

## 2025-02-14 DIAGNOSIS — N95.0 PMB (POSTMENOPAUSAL BLEEDING): ICD-10-CM

## 2025-02-14 DIAGNOSIS — R93.89 THICKENED ENDOMETRIUM: ICD-10-CM

## 2025-02-14 PROBLEM — Z98.890 STATUS POST HYSTEROSCOPY: Status: ACTIVE | Noted: 2025-02-14

## 2025-02-14 LAB
ABO GROUP BLD: NORMAL
RH BLD: POSITIVE

## 2025-02-14 PROCEDURE — 88305 TISSUE EXAM BY PATHOLOGIST: CPT | Performed by: PATHOLOGY

## 2025-02-14 PROCEDURE — 58558 HYSTEROSCOPY BIOPSY: CPT | Performed by: OBSTETRICS & GYNECOLOGY

## 2025-02-14 PROCEDURE — NC001 PR NO CHARGE: Performed by: OBSTETRICS & GYNECOLOGY

## 2025-02-14 RX ORDER — HYDROMORPHONE HCL/PF 1 MG/ML
0.5 SYRINGE (ML) INJECTION
Status: DISCONTINUED | OUTPATIENT
Start: 2025-02-14 | End: 2025-02-14 | Stop reason: HOSPADM

## 2025-02-14 RX ORDER — PROPOFOL 10 MG/ML
INJECTION, EMULSION INTRAVENOUS AS NEEDED
Status: DISCONTINUED | OUTPATIENT
Start: 2025-02-14 | End: 2025-02-14

## 2025-02-14 RX ORDER — LIDOCAINE HYDROCHLORIDE 20 MG/ML
INJECTION, SOLUTION EPIDURAL; INFILTRATION; INTRACAUDAL; PERINEURAL AS NEEDED
Status: DISCONTINUED | OUTPATIENT
Start: 2025-02-14 | End: 2025-02-14

## 2025-02-14 RX ORDER — ACETAMINOPHEN 325 MG/1
975 TABLET ORAL EVERY 6 HOURS PRN
Status: DISCONTINUED | OUTPATIENT
Start: 2025-02-14 | End: 2025-02-14 | Stop reason: HOSPADM

## 2025-02-14 RX ORDER — HEPARIN SODIUM 5000 [USP'U]/ML
5000 INJECTION, SOLUTION INTRAVENOUS; SUBCUTANEOUS
Status: COMPLETED | OUTPATIENT
Start: 2025-02-14 | End: 2025-02-14

## 2025-02-14 RX ORDER — SODIUM CHLORIDE, SODIUM LACTATE, POTASSIUM CHLORIDE, CALCIUM CHLORIDE 600; 310; 30; 20 MG/100ML; MG/100ML; MG/100ML; MG/100ML
125 INJECTION, SOLUTION INTRAVENOUS CONTINUOUS
Status: DISCONTINUED | OUTPATIENT
Start: 2025-02-14 | End: 2025-02-14 | Stop reason: HOSPADM

## 2025-02-14 RX ORDER — EPHEDRINE SULFATE 50 MG/ML
INJECTION INTRAVENOUS AS NEEDED
Status: DISCONTINUED | OUTPATIENT
Start: 2025-02-14 | End: 2025-02-14

## 2025-02-14 RX ORDER — MIDAZOLAM HYDROCHLORIDE 2 MG/2ML
INJECTION, SOLUTION INTRAMUSCULAR; INTRAVENOUS AS NEEDED
Status: DISCONTINUED | OUTPATIENT
Start: 2025-02-14 | End: 2025-02-14

## 2025-02-14 RX ORDER — ONDANSETRON 2 MG/ML
4 INJECTION INTRAMUSCULAR; INTRAVENOUS ONCE AS NEEDED
Status: DISCONTINUED | OUTPATIENT
Start: 2025-02-14 | End: 2025-02-14 | Stop reason: HOSPADM

## 2025-02-14 RX ORDER — FENTANYL CITRATE/PF 50 MCG/ML
25 SYRINGE (ML) INJECTION
Status: DISCONTINUED | OUTPATIENT
Start: 2025-02-14 | End: 2025-02-14 | Stop reason: HOSPADM

## 2025-02-14 RX ORDER — ONDANSETRON 2 MG/ML
4 INJECTION INTRAMUSCULAR; INTRAVENOUS EVERY 6 HOURS PRN
Status: DISCONTINUED | OUTPATIENT
Start: 2025-02-14 | End: 2025-02-14 | Stop reason: HOSPADM

## 2025-02-14 RX ORDER — IBUPROFEN 600 MG/1
600 TABLET, FILM COATED ORAL EVERY 6 HOURS PRN
Status: DISCONTINUED | OUTPATIENT
Start: 2025-02-14 | End: 2025-02-14 | Stop reason: HOSPADM

## 2025-02-14 RX ORDER — FENTANYL CITRATE 50 UG/ML
INJECTION, SOLUTION INTRAMUSCULAR; INTRAVENOUS AS NEEDED
Status: DISCONTINUED | OUTPATIENT
Start: 2025-02-14 | End: 2025-02-14

## 2025-02-14 RX ORDER — ROCURONIUM BROMIDE 10 MG/ML
INJECTION, SOLUTION INTRAVENOUS AS NEEDED
Status: DISCONTINUED | OUTPATIENT
Start: 2025-02-14 | End: 2025-02-14

## 2025-02-14 RX ORDER — PROPOFOL 10 MG/ML
INJECTION, EMULSION INTRAVENOUS CONTINUOUS PRN
Status: DISCONTINUED | OUTPATIENT
Start: 2025-02-14 | End: 2025-02-14

## 2025-02-14 RX ORDER — ONDANSETRON 2 MG/ML
INJECTION INTRAMUSCULAR; INTRAVENOUS AS NEEDED
Status: DISCONTINUED | OUTPATIENT
Start: 2025-02-14 | End: 2025-02-14

## 2025-02-14 RX ORDER — DEXAMETHASONE SODIUM PHOSPHATE 10 MG/ML
INJECTION, SOLUTION INTRAMUSCULAR; INTRAVENOUS AS NEEDED
Status: DISCONTINUED | OUTPATIENT
Start: 2025-02-14 | End: 2025-02-14

## 2025-02-14 RX ORDER — ACETAMINOPHEN 325 MG/1
975 TABLET ORAL ONCE
Status: COMPLETED | OUTPATIENT
Start: 2025-02-14 | End: 2025-02-14

## 2025-02-14 RX ORDER — CLINDAMYCIN PHOSPHATE 900 MG/50ML
900 INJECTION, SOLUTION INTRAVENOUS ONCE
Status: COMPLETED | OUTPATIENT
Start: 2025-02-14 | End: 2025-02-14

## 2025-02-14 RX ADMIN — SODIUM CHLORIDE 0.1 MCG/KG/MIN: 9 INJECTION, SOLUTION INTRAVENOUS at 09:19

## 2025-02-14 RX ADMIN — CLINDAMYCIN PHOSPHATE 900 MG: 900 INJECTION, SOLUTION INTRAVENOUS at 09:27

## 2025-02-14 RX ADMIN — GENTAMICIN SULFATE 118.8 MG: 40 INJECTION, SOLUTION INTRAMUSCULAR; INTRAVENOUS at 09:31

## 2025-02-14 RX ADMIN — PROPOFOL 100 MG: 10 INJECTION, EMULSION INTRAVENOUS at 09:19

## 2025-02-14 RX ADMIN — EPHEDRINE SULFATE 5 MG: 50 INJECTION INTRAVENOUS at 09:35

## 2025-02-14 RX ADMIN — HEPARIN SODIUM 5000 UNITS: 5000 INJECTION INTRAVENOUS; SUBCUTANEOUS at 08:28

## 2025-02-14 RX ADMIN — LIDOCAINE HYDROCHLORIDE 100 MG: 20 INJECTION, SOLUTION EPIDURAL; INFILTRATION; INTRACAUDAL at 09:19

## 2025-02-14 RX ADMIN — MIDAZOLAM HYDROCHLORIDE 4 MG: 1 INJECTION, SOLUTION INTRAMUSCULAR; INTRAVENOUS at 09:11

## 2025-02-14 RX ADMIN — SODIUM CHLORIDE, SODIUM LACTATE, POTASSIUM CHLORIDE, AND CALCIUM CHLORIDE 125 ML/HR: .6; .31; .03; .02 INJECTION, SOLUTION INTRAVENOUS at 08:30

## 2025-02-14 RX ADMIN — ROCURONIUM 50 MG: 50 INJECTION, SOLUTION INTRAVENOUS at 09:19

## 2025-02-14 RX ADMIN — ACETAMINOPHEN 975 MG: 325 TABLET, FILM COATED ORAL at 08:08

## 2025-02-14 RX ADMIN — FOSAPREPITANT DIMEGLUMINE 150 MG: 150 INJECTION, POWDER, LYOPHILIZED, FOR SOLUTION INTRAVENOUS at 08:58

## 2025-02-14 RX ADMIN — ONDANSETRON 4 MG: 2 INJECTION INTRAMUSCULAR; INTRAVENOUS at 09:19

## 2025-02-14 RX ADMIN — PROPOFOL 140 MCG/KG/MIN: 10 INJECTION, EMULSION INTRAVENOUS at 09:19

## 2025-02-14 RX ADMIN — DEXAMETHASONE SODIUM PHOSPHATE 10 MG: 10 INJECTION INTRAMUSCULAR; INTRAVENOUS at 09:19

## 2025-02-14 RX ADMIN — EPHEDRINE SULFATE 5 MG: 50 INJECTION INTRAVENOUS at 09:49

## 2025-02-14 RX ADMIN — IBUPROFEN 600 MG: 600 TABLET, FILM COATED ORAL at 11:47

## 2025-02-14 NOTE — OP NOTE
OPERATIVE REPORT  PATIENT NAME: Libby Evans    :  1960  MRN: [id number]  Pt Location: [facility] OR ROOM 07    SURGERY DATE: 2025    Surgeons and Role:     * Cesar Bradley MD - Primary     * Rashmi Dumont MD - Assisting    Preop Diagnosis:  PMB (postmenopausal bleeding) [N95.0]  Thickened endometrium [R93.89]    Post-Op Diagnosis Codes:     * PMB (postmenopausal bleeding) [N95.0]     * Thickened endometrium [R93.89]    Procedure(s):  HYSTEROSCOPY WITH  RESECTION UTERINE TUMOR/FIBROID. DILATION AND CURETTAGE    Specimen(s):  ID Type Source Tests Collected by Time Destination   1 : endometrial polyp Tissue Endometrium TISSUE EXAM Cesar Bradley MD 2025 1002    2 : emc Tissue Endometrium TISSUE EXAM Cesar Bradley MD 2025 1006        Estimated Blood Loss:   Minimal    IV Fluids:    mL     Fluid deficit:   450 mL     Anesthesia Type:   General, ET     Operative Indications:  PMB (postmenopausal bleeding) [N95.0]  Thickened endometrium [R93.89]    Operative Findings:  Grossly normal appearing external genitalia with mild vaginal atrophy consistent post menopausal state   Cervix without lacerations or lesions   Uterus sounded to 7 cm   Hysteroscopic examination revealed approximately 4 cm pedunculated, polypoid mass with scarring to the posterior wall of the uterus consistent with previous ablation   Complete excision of mass using symphion device   Hemostatic tenaculum sites at conclusion of case         Complications:   None Apparent     Brief History:   Libby Evans is a 64-year-old with postmenopausal bleeding, thickened endometrium, history of pelvic endometriosis status post 2 prior laparoscopies, history of simple hyperplasia without atypia for which she took megace for 8 years, status post endometrial ablation in  who presented for further management. She had underwent an attempted D&C in 10/2024 which had been unsuccessful in the  setting of cervical stenosis. She had been counseled on option for repeat D&C vs hysterectomy for definitive management and opted for D&C.    Procedure and Technique:  The patient was taken to the operating room where a time out was performed to confirm correct patient and correct procedure. General endotracheal anesthesia (GET) was administered and the patient was positioned on the OR table in the dorsal lithotomy position. All pressure points were padded and a mary hugger was placed to maintain control of core body temperature. A bimanual exam was performed and the uterus was noted to be anteverted, normal in size and consistency with no palpable adnexal masses or fullness. The patient was prepped and draped in the usual sterile fashion.    A barbosa speculum was inserted into the vagina and a akiko was used to visualize the anterior lip of the cervix, which was then grasped with a single toothed tenaculum. The uterus was sounded to 7cm. The cervix was serially dilated to 21 using reynolds dilators for introduction of the hysteroscope.     Hysteroscope was introduced under direct visualization using normal saline solution as the distention media. Hysteroscope was advanced to the uterine fundus and the entire uterine cavity was inspected in a systematic manner. There was noted to be an approximately 4 cm pedunculated, polypoid structure filling the endometrial cavity with scarring to the posterior wall of the uterus consistent with previous endometrial ablation.     Hysteroscope was withdrawn and cervix was further dilated to accommodate symphion hysteroscope and resection device. Hysteroscope and resection device were advanced into uterine cavity and polyp was resected in its entirety.     Hysteroscope was withdrawn and sharp curetting was performed, starting at the 12'oclock position and rotating a total of 360 degrees to cover all surfaces. Endometrial tissue was obtained and sent for pathology.     The single  toothed tenaculum was removed from the anterior lip of the cervix. Good hemostasis was confirmed at the tenaculum puncture sites. Leija speculum was then removed from the vagina. At the conclusion of the procedure, all needle, sponge, and instrument counts were noted to be correct x2.     Dr. Bradley was present and participated in all key portions of the case.      Patient Disposition:  PACU              SIGNATURE: Rashmi Dumont MD  DATE: February 14, 2025  TIME: 10:27 AM

## 2025-02-14 NOTE — H&P
Assessment/Plan:    64-year-old with morbid obesity, BMI 42 kg/m², postmenopausal bleeding, prior endometrial ablation, history of endometrial hyperplasia, thickened endometrium measuring 2.1 cm, cervical stenosis.  Her performance status is 1.  1.  After discussion with the patient, she would prefer the most minimally invasive approach to dealing with her postmenopausal bleeding and thickened endometrium.  She would also prefer her ovaries remain in situ in the event that she does not have malignancy and requires hysterectomy.  2.  For discussed the risks of examination under anesthesia, dilation and curettage with hysteroscopy, possible robotic assisted total laparoscopic hysterectomy, bilateral salpingectomy, possible bilateral oophorectomy, possible exploratory laparotomy and all other indicated procedures.  She understands the risks of the operation and agrees to proceed as outlined.  Consent was obtained by me.  3.  She understands that if D&C is successful, she may require hysterectomy in the event that the final pathology demonstrates hyperplasia or malignancy.        CHIEF COMPLAINT: Here for surgery            Patient ID: Libby Evans is a 64 y.o. female  64-year-old presents for surgery.  She had been consented for robotic assisted total laparoscopic hysterectomy and bilateral salpingo-oophorectomy with intraoperative frozen section analysis for postmenopausal bleeding, thickened endometrium measuring 2.1 cm, history of prior endometrial hyperplasia.  The patient would prefer the most minimally invasive approach possible and therefore elected to change the procedure.  She has no complaints today.  There is been no other interval change in medications or medical history since her last visit to the office.        Review of Systems   Constitutional:  Negative for activity change and unexpected weight change.   HENT: Negative.     Eyes: Negative.    Respiratory: Negative.     Cardiovascular: Negative.  "   Gastrointestinal:  Negative for abdominal distention and abdominal pain.   Endocrine: Negative.    Genitourinary:  Negative for pelvic pain and vaginal bleeding.   Musculoskeletal: Negative.    Skin: Negative.    Allergic/Immunologic: Negative.    Neurological: Negative.    Hematological: Negative.    Psychiatric/Behavioral: Negative.         Current Facility-Administered Medications   Medication Dose Route Frequency Provider Last Rate Last Admin    clindamycin (CLEOCIN) IVPB (premix in dextrose) 900 mg 50 mL  900 mg Intravenous Once Cesar Bradley MD        And    gentamicin (GARAMYCIN) 118.8 mg in sodium chloride 0.9 % 100 mL IVPB  1.5 mg/kg (Adjusted) Intravenous Once Cesar Bradley MD        fosaprepitant (EMEND) 150 mg in sodium chloride 0.9 % 250 mL IVPB  150 mg Intravenous Once Hue Brown MD        HYDROmorphone (DILAUDID) injection 0.5 mg  0.5 mg Intravenous Q10 Min PRN Gómez Parlin, DO        lactated ringers infusion  125 mL/hr Intravenous Continuous Gómez Kaurlin, DO        lactated ringers infusion  125 mL/hr Intravenous Continuous Cesar Bradley  mL/hr at 02/14/25 0830 125 mL/hr at 02/14/25 0830       Allergies   Allergen Reactions    Doxycycline Other (See Comments)     \"Become nonfunctional\"    Crab (Diagnostic) - Food Allergy Diarrhea     Per pt \"if eats crabmeat gets diarrhea\"    Cephalexin Other (See Comments)     Pt can not recall if actually allergic to cephalexin    Ether For Anesthesia [Ether] Other (See Comments)     Severe intractable PONV with inhalational anesthetics, propofol OK       Past Medical History:   Diagnosis Date    Abnormal LFTs     Abscess of right axilla     pt called PCP completed Bactrim antibiotic (LD 2/11/25)and also did notify surgeon office    Breast cancer, right (HCC) 2017    s/p lumpectomy & chemo    Cancer (HCC)     triple + right breast cancer    Cataract     right    Dental crown present     Difficult intravenous access  " "   Dry eyes     Dry skin     Family history of reaction to anesthesia     \"pt Mom and other relatives get PONV\"    Fatty liver     sometimes labs are high    Fracture, ankle 2021    right ankle    GERD (gastroesophageal reflux disease)     History of abnormal uterine bleeding     With failed endometrial ablation and Megace therapy for 8 years    History of cancer chemotherapy     History of endometriosis     History of radiation therapy     History of screening mammography 2021    Negative per pt. Done at [facility]. Ordered by breast surgeon/Oncologist.    Limb alert care status     Do Not use Right Arm    Osteoarthritis     knees    Ovarian cyst     pt not sure of having cysts    Papanicolaou smear 2018    PONV (postoperative nausea and vomiting)     \"with general anesthesia\"    Proximal humerus fracture 2020    left shoulder--very limited ROM per pt--no surgery    Uterine fibroid     pt not sure if has fibroids    Wears glasses        Past Surgical History:   Procedure Laterality Date    ANKLE FRACTURE SURGERY  2021    right ankle--2 plates and 15 screws    BREAST BIOPSY Left     clips implanted    BREAST LUMPECTOMY Right     and SLND    COLONOSCOPY  2017    DILATION AND CURETTAGE OF UTERUS      DXA PROCEDURE (HISTORICAL)  2022    spine T-1.6, normal hip, low risk fracture    ENDOMETRIAL ABLATION W/ NOVASURE      LAPAROSCOPY   &     endometriosis    MAMMO (HISTORICAL)  2023    BIRADS 2B    MASTECTOMY  2017    Partial right    TONSILLECTOMY  1984    VARICOSE VEIN SURGERY  2018    Legs    WISDOM TOOTH EXTRACTION         OB History          0    Para   0    Term   0       0    AB   0    Living   0         SAB   0    IAB   0    Ectopic   0    Multiple   0    Live Births   0                 Family History   Problem Relation Age of Onset    No Known Problems Mother     Heart disease Father     No Known Problems Sister     No Known Problems Sister     " "Parkinsonism Maternal Grandmother     No Known Problems Maternal Grandfather     No Known Problems Paternal Grandmother     No Known Problems Paternal Grandfather     Colorectal Cancer Neg Hx     Colon cancer Neg Hx     Breast cancer Neg Hx     Uterine cancer Neg Hx     Ovarian cancer Neg Hx        The following portions of the patient's history were reviewed and updated as appropriate: allergies, current medications, past family history, past medical history, past social history, past surgical history, and problem list.      Objective:    Blood pressure 141/65, pulse 75, temperature 97.8 °F (36.6 °C), temperature source Temporal, resp. rate 18, height 5' 4.5\" (1.638 m), weight 115 kg (253 lb 4.9 oz), SpO2 96%, not currently breastfeeding.  Body mass index is 42.81 kg/m².    Physical Exam  Vitals reviewed.   Constitutional:       General: She is not in acute distress.     Appearance: Normal appearance. She is not ill-appearing.   HENT:      Head: Normocephalic and atraumatic.      Mouth/Throat:      Mouth: Mucous membranes are moist.   Eyes:      General: No scleral icterus.        Right eye: No discharge.         Left eye: No discharge.      Conjunctiva/sclera: Conjunctivae normal.   Cardiovascular:      Rate and Rhythm: Normal rate and regular rhythm.      Heart sounds: Normal heart sounds.   Pulmonary:      Effort: Pulmonary effort is normal.      Breath sounds: Normal breath sounds.   Abdominal:      Palpations: Abdomen is soft.   Musculoskeletal:      Right lower leg: No edema.      Left lower leg: No edema.   Skin:     General: Skin is warm and dry.      Coloration: Skin is not jaundiced.      Findings: No rash.   Neurological:      General: No focal deficit present.      Mental Status: She is alert and oriented to person, place, and time.      Cranial Nerves: No cranial nerve deficit.      Motor: No weakness.      Gait: Gait normal.   Psychiatric:         Mood and Affect: Mood normal.         Behavior: " "Behavior normal.         Thought Content: Thought content normal.         Judgment: Judgment normal.           No results found for: \"\"  Lab Results   Component Value Date    WBC 6.46 02/02/2025    HGB 14.5 02/02/2025    HCT 42.4 02/02/2025    MCV 93 02/02/2025     02/02/2025     Lab Results   Component Value Date    K 3.9 02/02/2025     02/02/2025    CO2 28 02/02/2025    BUN 12 02/02/2025    CREATININE 0.78 02/02/2025    GLUF 91 02/02/2025    CALCIUM 9.2 02/02/2025    AST 37 02/02/2025    ALT 40 02/02/2025    ALKPHOS 88 02/02/2025    EGFR 81 02/02/2025        Trend:  No results found for: \"\"      "

## 2025-02-14 NOTE — ANESTHESIA POSTPROCEDURE EVALUATION
Post-Op Assessment Note    CV Status:  Stable    Pain management: adequate       Mental Status:  Sleepy     Post Op Vitals Reviewed: Yes    No anethesia notable event occurred.    Staff: CRNA           Last Filed PACU Vitals:  Vitals Value Taken Time   Temp 97.2 °F (36.2 °C) 02/14/25 1020   Pulse 92 02/14/25 1021   /63 02/14/25 1020   Resp 24 02/14/25 1021   SpO2 92 % 02/14/25 1021   Vitals shown include unfiled device data.

## 2025-02-14 NOTE — ANESTHESIA PREPROCEDURE EVALUATION
Procedure:  ROBOTIC ASSISTED LTH , BSO, EUA, PSB LYMPH NODE DISSECTION, PSB EXPLORATORY LAPAROTOMY, (Abdomen)    Relevant Problems   ANESTHESIA   (+) PONV (postoperative nausea and vomiting) (Severe, intractable PONV with inhalational anesthesia)      GI/HEPATIC   (+) Fatty liver   (+) GERD (gastroesophageal reflux disease)      GYN   (+) Breast cancer, right (HCC). 2017      Obstetrics/Gynecology   (+) Endometriosis   (+) PMB (postmenopausal bleeding)      Other   (+) BMI 40.0-44.9, adult (HCC)        Physical Exam    Airway    Mallampati score: II  TM Distance: <3 FB       Dental   No notable dental hx     Cardiovascular  Rhythm: regular, Rate: normal    Pulmonary   Breath sounds clear to auscultation    Other Findings  post-pubertal.      Anesthesia Plan  ASA Score- 3     Anesthesia Type- general with ASA Monitors.         Additional Monitors: arterial line.    Airway Plan: ETT.    Comment: GA ETT, discussed taps, art line, 2nd PIV if needed, emend IV pre-op, full TIVA for PONV PPX.       Plan Factors-Exercise tolerance (METS): >4 METS.    Chart reviewed.   Existing labs reviewed.     Patient is not a current smoker.      Obstructive sleep apnea risk education given perioperatively.        Induction- intravenous.    Postoperative Plan- Plan for postoperative opioid use.         Informed Consent- Anesthetic plan and risks discussed with patient.        NPO Status:  Vitals Value Taken Time   Date of last liquid 02/14/25 02/14/25 0720   Time of last liquid 0515 02/14/25 0720   Date of last solid 02/13/25 02/14/25 0720   Time of last solid 1400 02/14/25 0720

## 2025-02-14 NOTE — ANESTHESIA POSTPROCEDURE EVALUATION
Post-Op Assessment Note    CV Status:  Stable    Pain management: adequate       Mental Status:  Awake   Hydration Status:  Stable   PONV Controlled:  Controlled   Airway Patency:  Patent     Post Op Vitals Reviewed: Yes    No anethesia notable event occurred.    Staff: Anesthesiologist           Last Filed PACU Vitals:  Vitals Value Taken Time   Temp 97.5 °F (36.4 °C) 02/14/25 1050   Pulse 82 02/14/25 1110   /62 02/14/25 1105   Resp 20 02/14/25 1110   SpO2 92 % 02/14/25 1109   Vitals shown include unfiled device data.    Modified Roney:     Vitals Value Taken Time   Activity 2 02/14/25 1035   Respiration 2 02/14/25 1035   Circulation 2 02/14/25 1035   Consciousness 2 02/14/25 1035   Oxygen Saturation 2 02/14/25 1035     Modified Roney Score: 10

## 2025-02-23 ENCOUNTER — TELEPHONE (OUTPATIENT)
Dept: OTHER | Facility: OTHER | Age: 65
End: 2025-02-23

## 2025-02-23 ENCOUNTER — NURSE TRIAGE (OUTPATIENT)
Dept: OTHER | Facility: OTHER | Age: 65
End: 2025-02-23

## 2025-02-23 DIAGNOSIS — Z98.890 STATUS POST HYSTEROSCOPY: Primary | ICD-10-CM

## 2025-02-23 RX ORDER — FLUCONAZOLE 150 MG/1
150 TABLET ORAL ONCE
Qty: 1 TABLET | Refills: 0 | Status: SHIPPED | OUTPATIENT
Start: 2025-02-23 | End: 2025-02-23

## 2025-02-23 NOTE — ADDENDUM NOTE
Addended by: WOLFGANG GUO on: 2/23/2025 10:38 AM     Modules accepted: Orders     with RW/fair minus

## 2025-02-23 NOTE — TELEPHONE ENCOUNTER
Pt called because she checked with the pharmacy and they informed her that they have not received the Diflucan. Pt wanted to know if it can be sent over again.    On call notified via epic chat

## 2025-02-23 NOTE — TELEPHONE ENCOUNTER
Returned patient call.  Patient reports she noted urethral irritation immediately following surgery however this is progressed and now notes constant burning which is worse with urination.  However she denies urinary frequency, urgency.  She notes that she has had UTIs in the past and this is not consistent with her prior symptoms.  She tried taking Azo overnight with no improvement in symptoms.  She denies fevers and chills.    Reviewed that I suspect this is most likely a yeast infection.  I will put in urine studies in the event that Diflucan does not improve her symptoms so that she can drop off a urine sample.  I also reviewed that sometimes the vaginal prep can cause irritation.  Advised patient to call with any concerns or further worsening of symptoms.

## 2025-02-23 NOTE — TELEPHONE ENCOUNTER
Patient calling in with concerns she may have a yeast infection vs a UTI. Stated she started with mild urethral irritation after her surgery and on 2/21 her symptoms started to progress. Stated she is now having burning with urination, vaginal irritation, urgency and frequency. On call provider paged at this time and requested to call the patient back to discuss further.     Reason for Disposition   Affirmative: Did you page the on call provider?    Protocols used: GARRISONEILEEN NO TRIAGE REQUIRED

## 2025-02-26 ENCOUNTER — TELEPHONE (OUTPATIENT)
Age: 65
End: 2025-02-26

## 2025-02-26 ENCOUNTER — APPOINTMENT (OUTPATIENT)
Dept: LAB | Facility: HOSPITAL | Age: 65
End: 2025-02-26
Payer: COMMERCIAL

## 2025-02-26 DIAGNOSIS — N89.8 VAGINAL ITCHING: Primary | ICD-10-CM

## 2025-02-26 LAB
BACTERIA UR QL AUTO: ABNORMAL /HPF
BILIRUB UR QL STRIP: NEGATIVE
CLARITY UR: CLEAR
COLOR UR: YELLOW
GLUCOSE UR STRIP-MCNC: NEGATIVE MG/DL
HGB UR QL STRIP.AUTO: NEGATIVE
KETONES UR STRIP-MCNC: NEGATIVE MG/DL
LEUKOCYTE ESTERASE UR QL STRIP: NEGATIVE
NITRITE UR QL STRIP: NEGATIVE
NON-SQ EPI CELLS URNS QL MICRO: ABNORMAL /HPF
PH UR STRIP.AUTO: 5.5 [PH]
PROT UR STRIP-MCNC: NEGATIVE MG/DL
RBC #/AREA URNS AUTO: ABNORMAL /HPF
SP GR UR STRIP.AUTO: 1.01 (ref 1–1.03)
UROBILINOGEN UR STRIP-ACNC: <2 MG/DL
WBC #/AREA URNS AUTO: ABNORMAL /HPF

## 2025-02-26 PROCEDURE — 87086 URINE CULTURE/COLONY COUNT: CPT

## 2025-02-26 PROCEDURE — 81001 URINALYSIS AUTO W/SCOPE: CPT

## 2025-02-26 RX ORDER — FLUCONAZOLE 150 MG/1
150 TABLET ORAL ONCE
Qty: 1 TABLET | Refills: 0 | Status: SHIPPED | OUTPATIENT
Start: 2025-02-26 | End: 2025-02-26

## 2025-02-26 NOTE — TELEPHONE ENCOUNTER
FYI:  Pt calling had called in on Sunday to on call MD and was given Diflucan x1 for possible yeast infection.  Pt stated for few days she did feel better but now the same symptoms are back the itching feeling no discharge noted.    Pt did not go to lab for the urine test.  Pt stated has a urine test kit at home was +for leucocytes and - for nitrates.    Advised pt to go to the lab and provide a sample urine to be further tested,pt understood and will go this AM.    Advised pt to cont with increasing her po fluid intake she sated she drinks 6 glasses/day.  Pt reports no other symptoms at this time   with urination except the itching.    Pt s/p sx 2/14 Has POV sched for 3/6    Pls advise    Pt uses pharmacy:  [id number] - [place] - [address]  [address] [facility] [employer] [facility], [place] [address]  Phone: [phone]  Fax: [phone]

## 2025-02-28 LAB — BACTERIA UR CULT: NORMAL

## 2025-03-06 ENCOUNTER — OFFICE VISIT (OUTPATIENT)
Age: 65
End: 2025-03-06

## 2025-03-06 VITALS
RESPIRATION RATE: 12 BRPM | SYSTOLIC BLOOD PRESSURE: 126 MMHG | OXYGEN SATURATION: 98 % | HEART RATE: 96 BPM | TEMPERATURE: 97.3 F | HEIGHT: 65 IN | BODY MASS INDEX: 41.59 KG/M2 | DIASTOLIC BLOOD PRESSURE: 78 MMHG | WEIGHT: 249.6 LBS

## 2025-03-06 DIAGNOSIS — N95.0 PMB (POSTMENOPAUSAL BLEEDING): ICD-10-CM

## 2025-03-06 DIAGNOSIS — R93.89 THICKENED ENDOMETRIUM: Primary | ICD-10-CM

## 2025-03-06 PROCEDURE — 99024 POSTOP FOLLOW-UP VISIT: CPT | Performed by: OBSTETRICS & GYNECOLOGY

## 2025-03-06 NOTE — ASSESSMENT & PLAN NOTE
64-year-old with postmenopausal bleeding, thickened endometrium, history of simple hyperplasia without atypia status post endometrial ablation in 2008.  She was originally counseled for hysterectomy but desired attempt at dilation and curettage with hysteroscopy which was performed on 2/14/2024.  A large endometrial polyp was resected.  Final pathology was benign.  She is recovering well from surgery.  Her performance status is 0.  1.  She will follow-up with her gynecologist as scheduled and was encouraged to call the office with any postmenopausal bleeding.

## 2025-03-06 NOTE — LETTER
2025     Grace Brown MD  [address]  [address]  [facility] [place] [address]    Patient: Libby Evans   YOB: 1960   Date of Visit: 3/6/2025       Dear Dr. Brown:    Thank you for referring Libby Evans to me for evaluation. Below are my notes for this consultation.    If you have questions, please do not hesitate to call me. I look forward to following your patient along with you.         Sincerely,        Cesar Bradley MD        CC: No Recipients    Cesar Bradley MD  3/6/2025  3:26 PM  Sign when Signing Visit  Name: Libby Evans      : 1960      MRN: [id number]  Encounter Provider: Cesar Bradley MD  Encounter Date: 3/6/2025   Encounter department: [facility] GYNECOLOGY ONCOLOGY [facility]  :  Assessment & Plan  Thickened endometrium  Follow-up ultrasound in 6 months  Orders:  •  US pelvis complete w transvaginal; Future    PMB (postmenopausal bleeding)  64-year-old with postmenopausal bleeding, thickened endometrium, history of simple hyperplasia without atypia status post endometrial ablation in .  She was originally counseled for hysterectomy but desired attempt at dilation and curettage with hysteroscopy which was performed on 2024.  A large endometrial polyp was resected.  Final pathology was benign.  She is recovering well from surgery.  Her performance status is 0.  1.  She will follow-up with her gynecologist as scheduled and was encouraged to call the office with any postmenopausal bleeding.               History of Present Illness  Reason for Visit / CC: Postoperative evaluation  Libby Evans is a 64 y.o. female   Who returns for postoperative evaluation.  She is doing well.  She had vaginal bleeding for a couple of days after surgery.  Is since resolved.  She is back to her normal activity.  No other interval change in medications or medical history since her  "surgery.      Pertinent Medical History         Review of Systems A complete review of systems is negative other than that noted above in the HPI.  Current Outpatient Medications on File Prior to Visit   Medication Sig Dispense Refill   • fluticasone (FLONASE) 50 mcg/act nasal spray every evening     • omeprazole (PriLOSEC) 20 mg delayed release capsule Take 20 mg by mouth daily Takes 1/2 tab every morning     • Restasis 0.05 % ophthalmic emulsion 1 DROP INTO BOTH EYES TWICE DAILY     • vitamin E, tocopherol, 400 units capsule Take 1 capsule (400 Units total) by mouth daily 30 capsule 11   • triamcinolone (KENALOG) 0.1 % cream  (Patient not taking: Reported on 3/6/2025)       No current facility-administered medications on file prior to visit.         Objective  /78 (BP Location: Left arm, Patient Position: Sitting, Cuff Size: Large)   Pulse 96   Temp (!) 97.3 °F (36.3 °C) (Temporal)   Resp 12   Ht 5' 4.5\" (1.638 m)   Wt 113 kg (249 lb 9.6 oz)   SpO2 98%   BMI 42.18 kg/m²     Body mass index is 42.18 kg/m².  Pain Screening:  Pain Score: 0-No pain  ECOG   0  Physical Exam  Vitals reviewed.   Constitutional:       General: She is not in acute distress.     Appearance: Normal appearance. She is not ill-appearing.   HENT:      Head: Normocephalic and atraumatic.      Mouth/Throat:      Mouth: Mucous membranes are moist.   Eyes:      General: No scleral icterus.        Right eye: No discharge.         Left eye: No discharge.      Conjunctiva/sclera: Conjunctivae normal.   Pulmonary:      Effort: Pulmonary effort is normal.   Musculoskeletal:      Right lower leg: No edema.      Left lower leg: No edema.   Skin:     General: Skin is warm and dry.      Coloration: Skin is not jaundiced.      Findings: No rash.   Neurological:      General: No focal deficit present.      Mental Status: She is alert and oriented to person, place, and time.      Cranial Nerves: No cranial nerve deficit.      Motor: No weakness. " "     Gait: Gait normal.   Psychiatric:         Mood and Affect: Mood normal.         Behavior: Behavior normal.         Thought Content: Thought content normal.         Judgment: Judgment normal.          Labs: I have reviewed pertinent labs.   No results found for: \"\"  Lab Results   Component Value Date/Time    Potassium 3.9 02/02/2025 10:52 AM    Chloride 104 02/02/2025 10:52 AM    CO2 28 02/02/2025 10:52 AM    BUN 12 02/02/2025 10:52 AM    Creatinine 0.78 02/02/2025 10:52 AM    Glucose, Fasting 91 02/02/2025 10:52 AM    Calcium 9.2 02/02/2025 10:52 AM    AST 37 02/02/2025 10:52 AM    ALT 40 02/02/2025 10:52 AM    Alkaline Phosphatase 88 02/02/2025 10:52 AM    eGFR 81 02/02/2025 10:52 AM     Lab Results   Component Value Date/Time    WBC 6.46 02/02/2025 10:52 AM    Hemoglobin 14.5 02/02/2025 10:52 AM    Hematocrit 42.4 02/02/2025 10:52 AM    MCV 93 02/02/2025 10:52 AM    Platelets 218 02/02/2025 10:52 AM     No results found for: \"NEUTROABS\"     Trend:  No results found for: \"\"      Other Study Results Review : Pathology reports reviewed.      "

## 2025-03-06 NOTE — PROGRESS NOTES
Name: Libby Evans      : 1960      MRN: [id number]  Encounter Provider: Cesar Bradley MD  Encounter Date: 3/6/2025   Encounter department: [facility] (formerly [facility] GYNECOLOGY ONCOLOGY [facility]  :  Assessment & Plan  Thickened endometrium  Follow-up ultrasound in 6 months  Orders:    US pelvis complete w transvaginal; Future    PMB (postmenopausal bleeding)  64-year-old with postmenopausal bleeding, thickened endometrium, history of simple hyperplasia without atypia status post endometrial ablation in .  She was originally counseled for hysterectomy but desired attempt at dilation and curettage with hysteroscopy which was performed on 2024.  A large endometrial polyp was resected.  Final pathology was benign.  She is recovering well from surgery.  Her performance status is 0.  1.  She will follow-up with her gynecologist as scheduled and was encouraged to call the office with any postmenopausal bleeding.               History of Present Illness   Reason for Visit / CC: Postoperative evaluation  Libby Evans is a 64 y.o. female   Who returns for postoperative evaluation.  She is doing well.  She had vaginal bleeding for a couple of days after surgery.  Is since resolved.  She is back to her normal activity.  No other interval change in medications or medical history since her surgery.      Pertinent Medical History          Review of Systems A complete review of systems is negative other than that noted above in the HPI.  Current Outpatient Medications on File Prior to Visit   Medication Sig Dispense Refill    fluticasone (FLONASE) 50 mcg/act nasal spray every evening      omeprazole (PriLOSEC) 20 mg delayed release capsule Take 20 mg by mouth daily Takes 1/2 tab every morning      Restasis 0.05 % ophthalmic emulsion 1 DROP INTO BOTH EYES TWICE DAILY      vitamin E, tocopherol, 400 units capsule Take 1 capsule (400 Units total) by mouth daily 30  "capsule 11    triamcinolone (KENALOG) 0.1 % cream  (Patient not taking: Reported on 3/6/2025)       No current facility-administered medications on file prior to visit.         Objective   /78 (BP Location: Left arm, Patient Position: Sitting, Cuff Size: Large)   Pulse 96   Temp (!) 97.3 °F (36.3 °C) (Temporal)   Resp 12   Ht 5' 4.5\" (1.638 m)   Wt 113 kg (249 lb 9.6 oz)   SpO2 98%   BMI 42.18 kg/m²     Body mass index is 42.18 kg/m².  Pain Screening:  Pain Score: 0-No pain  ECOG   0  Physical Exam  Vitals reviewed.   Constitutional:       General: She is not in acute distress.     Appearance: Normal appearance. She is not ill-appearing.   HENT:      Head: Normocephalic and atraumatic.      Mouth/Throat:      Mouth: Mucous membranes are moist.   Eyes:      General: No scleral icterus.        Right eye: No discharge.         Left eye: No discharge.      Conjunctiva/sclera: Conjunctivae normal.   Pulmonary:      Effort: Pulmonary effort is normal.   Musculoskeletal:      Right lower leg: No edema.      Left lower leg: No edema.   Skin:     General: Skin is warm and dry.      Coloration: Skin is not jaundiced.      Findings: No rash.   Neurological:      General: No focal deficit present.      Mental Status: She is alert and oriented to person, place, and time.      Cranial Nerves: No cranial nerve deficit.      Motor: No weakness.      Gait: Gait normal.   Psychiatric:         Mood and Affect: Mood normal.         Behavior: Behavior normal.         Thought Content: Thought content normal.         Judgment: Judgment normal.          Labs: I have reviewed pertinent labs.   No results found for: \"\"  Lab Results   Component Value Date/Time    Potassium 3.9 02/02/2025 10:52 AM    Chloride 104 02/02/2025 10:52 AM    CO2 28 02/02/2025 10:52 AM    BUN 12 02/02/2025 10:52 AM    Creatinine 0.78 02/02/2025 10:52 AM    Glucose, Fasting 91 02/02/2025 10:52 AM    Calcium 9.2 02/02/2025 10:52 AM    AST 37 " "02/02/2025 10:52 AM    ALT 40 02/02/2025 10:52 AM    Alkaline Phosphatase 88 02/02/2025 10:52 AM    eGFR 81 02/02/2025 10:52 AM     Lab Results   Component Value Date/Time    WBC 6.46 02/02/2025 10:52 AM    Hemoglobin 14.5 02/02/2025 10:52 AM    Hematocrit 42.4 02/02/2025 10:52 AM    MCV 93 02/02/2025 10:52 AM    Platelets 218 02/02/2025 10:52 AM     No results found for: \"NEUTROABS\"     Trend:  No results found for: \"\"      Other Study Results Review : Pathology reports reviewed.      "

## 2025-03-19 ENCOUNTER — TELEPHONE (OUTPATIENT)
Age: 65
End: 2025-03-19

## 2025-03-19 NOTE — TELEPHONE ENCOUNTER
Barb from [facility] called requesting a routine screening script to be faxed over. The patient has an appointment 3/20/25.     Fax# [phone]  Phone# [phone]

## (undated) DEVICE — PREMIUM DRY TRAY LF: Brand: MEDLINE INDUSTRIES, INC.

## (undated) DEVICE — SUT MONOCRYL 4-0 PS-2 27 IN Y426H

## (undated) DEVICE — COLUMN DRAPE

## (undated) DEVICE — CHLORAPREP HI-LITE 26ML ORANGE

## (undated) DEVICE — ARM DRAPE

## (undated) DEVICE — SUT STRATAFIX SPIRAL 2-0 CT-1 30 CM SXPP1B410

## (undated) DEVICE — BLADELESS OBTURATOR: Brand: WECK VISTA

## (undated) DEVICE — STRL COTTON TIP APPLCTR 6IN PK: Brand: CARDINAL HEALTH

## (undated) DEVICE — SYRINGE 50ML LL

## (undated) DEVICE — CANNULA SEAL

## (undated) DEVICE — TIP COVER ACCESSORY

## (undated) DEVICE — DRAPE EQUIPMENT RF WAND

## (undated) DEVICE — 1820 FOAM BLOCK NEEDLE COUNTER: Brand: DEVON

## (undated) DEVICE — NEEDLE 25G X 1 1/2

## (undated) DEVICE — SYRINGE 10ML LL CONTROL TOP

## (undated) DEVICE — MAYO STAND COVER: Brand: CONVERTORS

## (undated) DEVICE — TISSUE REMOVAL SYSTEM FLUID MANAGEMENT ACCESSORIES: Brand: SYMPHION

## (undated) DEVICE — GLOVE INDICATOR PI UNDERGLOVE SZ 7 BLUE

## (undated) DEVICE — 3000CC GUARDIAN II: Brand: GUARDIAN

## (undated) DEVICE — 40595 XL TRENDELENBURG POSITIONING KIT: Brand: 40595 XL TRENDELENBURG POSITIONING KIT

## (undated) DEVICE — SUT VICRYL 0 CT-1 36 IN J946H

## (undated) DEVICE — EXIDINE 4 PCT

## (undated) DEVICE — GLOVE INDICATOR PI UNDERGLOVE SZ 8 BLUE

## (undated) DEVICE — INTENDED FOR TISSUE SEPARATION, AND OTHER PROCEDURES THAT REQUIRE A SHARP SURGICAL BLADE TO PUNCTURE OR CUT.: Brand: BARD-PARKER SAFETY BLADES SIZE 11, STERILE

## (undated) DEVICE — NEEDLE SPINAL 22G X 3.5IN  QUINCKE

## (undated) DEVICE — TISSUE REMOVAL SYSTEM RESECTING DEVICE: Brand: SYMPHION

## (undated) DEVICE — INTENDED FOR TISSUE SEPARATION, AND OTHER PROCEDURES THAT REQUIRE A SHARP SURGICAL BLADE TO PUNCTURE OR CUT.: Brand: BARD-PARKER SAFETY BLADES SIZE 15, STERILE

## (undated) DEVICE — KIT, BETHLEHEM ROBOTIC PROST: Brand: CARDINAL HEALTH

## (undated) DEVICE — GLOVE PI ULTRA TOUCH SZ.7.5

## (undated) DEVICE — ELECTRO LUBE IS A SINGLE PATIENT USE DEVICE THAT IS INTENDED TO BE USED ON ELECTROSURGICAL ELECTRODES TO REDUCE STICKING.: Brand: KEY SURGICAL ELECTRO LUBE

## (undated) DEVICE — TRAY FOLEY 16FR URIMETER SILICONE SURESTEP